# Patient Record
Sex: FEMALE | Race: OTHER | Employment: FULL TIME | URBAN - METROPOLITAN AREA
[De-identification: names, ages, dates, MRNs, and addresses within clinical notes are randomized per-mention and may not be internally consistent; named-entity substitution may affect disease eponyms.]

---

## 2017-01-09 PROBLEM — D86.0 PULMONARY SARCOIDOSIS (HCC): Status: ACTIVE | Noted: 2017-01-09

## 2017-03-24 ENCOUNTER — APPOINTMENT (OUTPATIENT)
Dept: GENERAL RADIOLOGY | Facility: HOSPITAL | Age: 57
End: 2017-03-24
Attending: EMERGENCY MEDICINE
Payer: MEDICARE

## 2017-03-24 ENCOUNTER — HOSPITAL ENCOUNTER (OUTPATIENT)
Facility: HOSPITAL | Age: 57
Setting detail: OBSERVATION
Discharge: HOME | End: 2017-03-26
Attending: EMERGENCY MEDICINE | Admitting: HOSPITALIST
Payer: MEDICARE

## 2017-03-24 DIAGNOSIS — R55 SYNCOPE, UNSPECIFIED SYNCOPE TYPE: Primary | ICD-10-CM

## 2017-03-24 DIAGNOSIS — M25.561 RIGHT KNEE PAIN, UNSPECIFIED CHRONICITY: ICD-10-CM

## 2017-03-24 PROCEDURE — 93010 ELECTROCARDIOGRAM REPORT: CPT | Performed by: EMERGENCY MEDICINE

## 2017-03-24 PROCEDURE — 84484 ASSAY OF TROPONIN QUANT: CPT | Performed by: EMERGENCY MEDICINE

## 2017-03-24 PROCEDURE — 85025 COMPLETE CBC W/AUTO DIFF WBC: CPT | Performed by: EMERGENCY MEDICINE

## 2017-03-24 PROCEDURE — 93005 ELECTROCARDIOGRAM TRACING: CPT

## 2017-03-24 PROCEDURE — 73560 X-RAY EXAM OF KNEE 1 OR 2: CPT

## 2017-03-24 PROCEDURE — 96360 HYDRATION IV INFUSION INIT: CPT

## 2017-03-24 PROCEDURE — 71010 XR CHEST AP PORTABLE  (CPT=71010): CPT

## 2017-03-24 PROCEDURE — 99285 EMERGENCY DEPT VISIT HI MDM: CPT

## 2017-03-24 PROCEDURE — 80053 COMPREHEN METABOLIC PANEL: CPT | Performed by: EMERGENCY MEDICINE

## 2017-03-25 ENCOUNTER — APPOINTMENT (OUTPATIENT)
Dept: GENERAL RADIOLOGY | Facility: HOSPITAL | Age: 57
End: 2017-03-25
Attending: EMERGENCY MEDICINE
Payer: MEDICARE

## 2017-03-25 ENCOUNTER — APPOINTMENT (OUTPATIENT)
Dept: CV DIAGNOSTICS | Facility: HOSPITAL | Age: 57
End: 2017-03-25
Attending: HOSPITALIST
Payer: MEDICARE

## 2017-03-25 PROBLEM — R55 SYNCOPE, UNSPECIFIED SYNCOPE TYPE: Status: ACTIVE | Noted: 2017-03-25

## 2017-03-25 PROBLEM — M25.561 RIGHT KNEE PAIN, UNSPECIFIED CHRONICITY: Status: ACTIVE | Noted: 2017-03-25

## 2017-03-25 PROBLEM — R55 SYNCOPE: Status: ACTIVE | Noted: 2017-03-25

## 2017-03-25 LAB
ALBUMIN SERPL BCP-MCNC: 3.7 G/DL (ref 3.5–4.8)
ALBUMIN/GLOB SERPL: 1.4 {RATIO} (ref 1–2)
ALP SERPL-CCNC: 99 U/L (ref 32–100)
ALT SERPL-CCNC: 13 U/L (ref 14–54)
ANION GAP SERPL CALC-SCNC: 8 MMOL/L (ref 0–18)
AST SERPL-CCNC: 22 U/L (ref 15–41)
BASOPHILS # BLD: 0.1 K/UL (ref 0–0.2)
BASOPHILS NFR BLD: 3 %
BENZODIAZ UR QL SCN: DETECTED
BILIRUB SERPL-MCNC: 0.5 MG/DL (ref 0.3–1.2)
BILIRUB UR QL: NEGATIVE
BUN SERPL-MCNC: 10 MG/DL (ref 8–20)
BUN/CREAT SERPL: 13.5 (ref 10–20)
CALCIUM SERPL-MCNC: 9.2 MG/DL (ref 8.5–10.5)
CHLORIDE SERPL-SCNC: 104 MMOL/L (ref 95–110)
CLARITY UR: CLEAR
CO2 SERPL-SCNC: 26 MMOL/L (ref 22–32)
COLOR UR: YELLOW
CREAT SERPL-MCNC: 0.74 MG/DL (ref 0.5–1.5)
EOSINOPHIL # BLD: 0.7 K/UL (ref 0–0.7)
EOSINOPHIL NFR BLD: 17 %
ERYTHROCYTE [DISTWIDTH] IN BLOOD BY AUTOMATED COUNT: 13.2 % (ref 11–15)
GLOBULIN PLAS-MCNC: 2.6 G/DL (ref 2.5–3.7)
GLUCOSE SERPL-MCNC: 83 MG/DL (ref 70–99)
GLUCOSE UR-MCNC: NEGATIVE MG/DL
HCT VFR BLD AUTO: 36.4 % (ref 35–48)
HGB BLD-MCNC: 11.5 G/DL (ref 12–16)
HGB UR QL STRIP.AUTO: NEGATIVE
LYMPHOCYTES # BLD: 1.6 K/UL (ref 1–4)
LYMPHOCYTES NFR BLD: 38 %
MCH RBC QN AUTO: 28.2 PG (ref 27–32)
MCHC RBC AUTO-ENTMCNC: 31.7 G/DL (ref 32–37)
MCV RBC AUTO: 89 FL (ref 80–100)
MONOCYTES # BLD: 0.4 K/UL (ref 0–1)
MONOCYTES NFR BLD: 9 %
NEUTROPHILS # BLD AUTO: 1.4 K/UL (ref 1.8–7.7)
NEUTROPHILS NFR BLD: 34 %
NITRITE UR QL STRIP.AUTO: NEGATIVE
OSMOLALITY UR CALC.SUM OF ELEC: 284 MOSM/KG (ref 275–295)
OXYCODONE UR QL SCN: DETECTED
PH UR: 5 [PH] (ref 5–8)
PLATELET # BLD AUTO: 233 K/UL (ref 140–400)
PMV BLD AUTO: 8.7 FL (ref 7.4–10.3)
POTASSIUM SERPL-SCNC: 4.6 MMOL/L (ref 3.3–5.1)
PROT SERPL-MCNC: 6.3 G/DL (ref 5.9–8.4)
PROT UR-MCNC: NEGATIVE MG/DL
RBC # BLD AUTO: 4.09 M/UL (ref 3.7–5.4)
RBC #/AREA URNS AUTO: <1 /HPF
SODIUM SERPL-SCNC: 138 MMOL/L (ref 136–144)
SP GR UR STRIP: 1.02 (ref 1–1.03)
TROPONIN I SERPL-MCNC: 0 NG/ML (ref ?–0.03)
TROPONIN I SERPL-MCNC: 0 NG/ML (ref ?–0.03)
UROBILINOGEN UR STRIP-ACNC: 2
VIT C UR-MCNC: NEGATIVE MG/DL
WBC # BLD AUTO: 4.1 K/UL (ref 4–11)
WBC #/AREA URNS AUTO: 3 /HPF

## 2017-03-25 PROCEDURE — 72100 X-RAY EXAM L-S SPINE 2/3 VWS: CPT

## 2017-03-25 PROCEDURE — 73502 X-RAY EXAM HIP UNI 2-3 VIEWS: CPT

## 2017-03-25 PROCEDURE — 93306 TTE W/DOPPLER COMPLETE: CPT

## 2017-03-25 PROCEDURE — 84484 ASSAY OF TROPONIN QUANT: CPT | Performed by: HOSPITALIST

## 2017-03-25 PROCEDURE — 80307 DRUG TEST PRSMV CHEM ANLYZR: CPT | Performed by: EMERGENCY MEDICINE

## 2017-03-25 PROCEDURE — 81001 URINALYSIS AUTO W/SCOPE: CPT | Performed by: EMERGENCY MEDICINE

## 2017-03-25 PROCEDURE — 96372 THER/PROPH/DIAG INJ SC/IM: CPT

## 2017-03-25 PROCEDURE — 93306 TTE W/DOPPLER COMPLETE: CPT | Performed by: INTERNAL MEDICINE

## 2017-03-25 RX ORDER — CARISOPRODOL 350 MG/1
350 TABLET ORAL EVERY 8 HOURS PRN
COMMUNITY
End: 2017-05-22

## 2017-03-25 RX ORDER — FUROSEMIDE 20 MG/1
20 TABLET ORAL
Status: ON HOLD | COMMUNITY
End: 2017-03-26

## 2017-03-25 RX ORDER — FLUTICASONE PROPIONATE 50 MCG
2 SPRAY, SUSPENSION (ML) NASAL DAILY
Status: DISCONTINUED | OUTPATIENT
Start: 2017-03-25 | End: 2017-03-26

## 2017-03-25 RX ORDER — MELATONIN
325 DAILY
Status: DISCONTINUED | OUTPATIENT
Start: 2017-03-25 | End: 2017-03-26

## 2017-03-25 RX ORDER — FUROSEMIDE 20 MG/1
20 TABLET ORAL DAILY
Status: DISCONTINUED | OUTPATIENT
Start: 2017-03-26 | End: 2017-03-25

## 2017-03-25 RX ORDER — SULFAMETHOXAZOLE AND TRIMETHOPRIM 800; 160 MG/1; MG/1
1 TABLET ORAL
Status: DISCONTINUED | OUTPATIENT
Start: 2017-03-27 | End: 2017-03-26

## 2017-03-25 RX ORDER — OXYCODONE AND ACETAMINOPHEN 10; 325 MG/1; MG/1
1 TABLET ORAL EVERY 6 HOURS PRN
Status: ON HOLD | COMMUNITY
End: 2017-03-26

## 2017-03-25 RX ORDER — NYSTATIN 100000 U/G
OINTMENT TOPICAL 4 TIMES DAILY
Status: DISCONTINUED | OUTPATIENT
Start: 2017-03-25 | End: 2017-03-25

## 2017-03-25 RX ORDER — POLYETHYLENE GLYCOL 3350 17 G/17G
17 POWDER, FOR SOLUTION ORAL DAILY
Status: DISCONTINUED | OUTPATIENT
Start: 2017-03-25 | End: 2017-03-26

## 2017-03-25 RX ORDER — LISINOPRIL 10 MG/1
10 TABLET ORAL 2 TIMES DAILY
Status: DISCONTINUED | OUTPATIENT
Start: 2017-03-25 | End: 2017-03-26

## 2017-03-25 RX ORDER — DULOXETIN HYDROCHLORIDE 30 MG/1
30 CAPSULE, DELAYED RELEASE ORAL EVERY EVENING
Status: DISCONTINUED | OUTPATIENT
Start: 2017-03-25 | End: 2017-03-26

## 2017-03-25 RX ORDER — FUROSEMIDE 20 MG/1
20 TABLET ORAL SEE ADMIN INSTRUCTIONS
Status: DISCONTINUED | OUTPATIENT
Start: 2017-03-25 | End: 2017-03-25

## 2017-03-25 RX ORDER — GABAPENTIN 300 MG/1
600 CAPSULE ORAL 3 TIMES DAILY
Status: DISCONTINUED | OUTPATIENT
Start: 2017-03-25 | End: 2017-03-26

## 2017-03-25 RX ORDER — OXYCODONE AND ACETAMINOPHEN 10; 325 MG/1; MG/1
1 TABLET ORAL ONCE
Status: COMPLETED | OUTPATIENT
Start: 2017-03-25 | End: 2017-03-25

## 2017-03-25 RX ORDER — ALPRAZOLAM 1 MG/1
1 TABLET ORAL 3 TIMES DAILY PRN
Status: DISCONTINUED | OUTPATIENT
Start: 2017-03-25 | End: 2017-03-26

## 2017-03-25 RX ORDER — NYSTATIN 10B UNIT
1 POWDER (EA) MISCELLANEOUS DAILY
COMMUNITY
End: 2017-07-11

## 2017-03-25 RX ORDER — ENOXAPARIN SODIUM 100 MG/ML
40 INJECTION SUBCUTANEOUS DAILY
Status: DISCONTINUED | OUTPATIENT
Start: 2017-03-25 | End: 2017-03-26

## 2017-03-25 RX ORDER — LIDOCAINE 50 MG/G
3 PATCH TOPICAL DAILY
Status: DISCONTINUED | OUTPATIENT
Start: 2017-03-25 | End: 2017-03-26

## 2017-03-25 RX ORDER — METOCLOPRAMIDE 10 MG/1
10 TABLET ORAL
Status: DISCONTINUED | OUTPATIENT
Start: 2017-03-25 | End: 2017-03-26

## 2017-03-25 RX ORDER — PANTOPRAZOLE SODIUM 40 MG/1
40 TABLET, DELAYED RELEASE ORAL
Status: DISCONTINUED | OUTPATIENT
Start: 2017-03-25 | End: 2017-03-26

## 2017-03-25 RX ORDER — IBUPROFEN 400 MG/1
400 TABLET ORAL EVERY 6 HOURS PRN
Status: DISCONTINUED | OUTPATIENT
Start: 2017-03-25 | End: 2017-03-26

## 2017-03-25 RX ORDER — POLYETHYLENE GLYCOL 3350 17 G/17G
17 POWDER, FOR SOLUTION ORAL
Status: DISCONTINUED | OUTPATIENT
Start: 2017-03-25 | End: 2017-03-25

## 2017-03-25 RX ORDER — MONTELUKAST SODIUM 10 MG/1
10 TABLET ORAL NIGHTLY
Status: DISCONTINUED | OUTPATIENT
Start: 2017-03-25 | End: 2017-03-26

## 2017-03-25 RX ORDER — TOBRAMYCIN 3 MG/ML
1 SOLUTION/ DROPS OPHTHALMIC 4 TIMES DAILY
Status: DISCONTINUED | OUTPATIENT
Start: 2017-03-25 | End: 2017-03-25

## 2017-03-25 RX ORDER — FUROSEMIDE 40 MG/1
40 TABLET ORAL DAILY
Status: DISCONTINUED | OUTPATIENT
Start: 2017-03-26 | End: 2017-03-26

## 2017-03-25 RX ORDER — DULOXETIN HYDROCHLORIDE 30 MG/1
60 CAPSULE, DELAYED RELEASE ORAL DAILY
Status: DISCONTINUED | OUTPATIENT
Start: 2017-03-25 | End: 2017-03-26

## 2017-03-25 RX ORDER — OXYCODONE AND ACETAMINOPHEN 10; 325 MG/1; MG/1
2 TABLET ORAL EVERY 6 HOURS PRN
Status: DISCONTINUED | OUTPATIENT
Start: 2017-03-25 | End: 2017-03-26

## 2017-03-25 RX ORDER — CYCLOBENZAPRINE HCL 10 MG
10 TABLET ORAL 3 TIMES DAILY PRN
Status: DISCONTINUED | OUTPATIENT
Start: 2017-03-25 | End: 2017-03-26

## 2017-03-25 RX ORDER — ATORVASTATIN CALCIUM 10 MG/1
10 TABLET, FILM COATED ORAL NIGHTLY
Status: DISCONTINUED | OUTPATIENT
Start: 2017-03-25 | End: 2017-03-26

## 2017-03-25 RX ORDER — ALBUTEROL SULFATE 90 UG/1
2 AEROSOL, METERED RESPIRATORY (INHALATION) EVERY 6 HOURS PRN
Status: DISCONTINUED | OUTPATIENT
Start: 2017-03-25 | End: 2017-03-26

## 2017-03-25 RX ORDER — RISPERIDONE 0.5 MG/1
1 TABLET, FILM COATED ORAL NIGHTLY
Status: DISCONTINUED | OUTPATIENT
Start: 2017-03-25 | End: 2017-03-26

## 2017-03-25 NOTE — ED INITIAL ASSESSMENT (HPI)
Patient state that she has 3 things she needs address:  Right knee replacement is \"bothering her\". Fainting spells that started a week ago. Patient states that she can't eat much because she has a hiatal hernia.

## 2017-03-25 NOTE — PLAN OF CARE
Patient/Family Goals    • Patient/Family Long Term Goal Progressing    • Patient/Family Short Term Goal Progressing          Pain management with Percocet  Echo result pending  PT/OT result, pt refused treatment for now due to pain in right knee  Dr. Fior Medina

## 2017-03-25 NOTE — DISCHARGE PLANNING
Per pt, pt lives at home alone. Pt stated she has a service dog. Pt reported that she has a cane ans power wheelchair, but the power wheelchair has not been working. Pt stated she is current w/ [facility]. Pt reported having a CG for 100hrs a month.  Pt r

## 2017-03-25 NOTE — CONSULTS
[facility] Cardiology  Report of Consultation    Emmanuel Watkins Patient Status:  Observation    1960 MRN [id number]   Location [address] [facility] Attending Jordan Smiley, [address] [facility] Day # 1 PCP Barron Azevedo MD     Date of Admission:  3/24/2017   Date o HISTORY      Comment rot cuff surgery    OTHER SURGICAL HISTORY      Comment R foot surgery    OTHER SURGICAL HISTORY      Comment left rotator cuff    OTHER SURGICAL HISTORY      Comment R ankle and foot surgery    OTHER SURGICAL HISTORY      Comment josé miguel Instructions   gabapentin (NEURONTIN) tab 600 mg 600 mg Oral TID   lidocaine (LIDODERM) 5 % 3 patch 3 patch Transdermal Daily   Metoclopramide HCl (REGLAN) tab 10 mg 10 mg Oral TID AC and HS   Montelukast Sodium (SINGULAIR) tab 10 mg 10 mg Oral Daily   nys trauma, normocephalic. EYES:conjunctiva not injected, no xanthelasma. ENT:mucosa pink and moist. NECK:jugular venous pressure not elevated. RESP:normal rate and rhythm, clear to auscultation. GI:soft, non-tender;rectal deferred.  MS:adequate gait for exerci Tortuous aorta. 3. Osteoarthritis. 4. Postop changes in the cervical spine, both shoulders and in the abdomen. 5. Total left shoulder if the plasty a new finding when compared to July 3, 2015.  6. A preliminary report was submitted and there is agreement wi

## 2017-03-25 NOTE — ED PROVIDER NOTES
Patient Seen in: [facility] AND [facility] Emergency Department    History   Patient presents with:  Fall (musculoskeletal, neurologic)    Stated Complaint: frequent falls     HPI    54 yo F with PMH fibromyalgia, COPD/asthma, sarcoidosis, right FITO presenting fro Ointment,  APPLY 2 GRAMS TWICE A DAY EXTERNALLY TO AFFECTED AREAS FOR KNEE AND JOINT PAIN   alprazolam 1 MG Oral Tab,  Take 1 tablet (1 mg total) by mouth 3 (three) times daily as needed for Sleep or Anxiety.    TiZANidine HCl 2 MG Oral Tab,  TAKE 3 TABLETS 5 mL (500,000 Units total) by mouth 4 (four) times daily. Swish and spit. Albuterol Sulfate HFA (PROAIR HFA) 108 (90 BASE) MCG/ACT Inhalation Aero Soln,  Inhale 2 puffs into the lungs every 6 (six) hours as needed for Wheezing.    predniSONE 20 MG Oral Ta TABLET BY MOUTH EVERY DAY   JUBLIA 10 % External Solution,     Docusate Sodium 100 MG Oral Tab,  Take 100 mg by mouth daily. lidocaine (LIDODERM) 5 % External Patch,  Place 3 patches onto the skin daily. For 12 hours then remove.    Polyethylene Glycol 33 (36.9 °C)   Temp src 03/24/17 2134 Oral   SpO2 03/24/17 2134 98 %   O2 Device 03/24/17 2134 None (Room air)       Current:/83 mmHg  Pulse 73  Temp(Src) 98.4 °F (36.9 °C) (Oral)  Resp 18  Ht 160 cm (5' 3\")  Wt 104. 327 kg  BMI 40.75 kg/m2  SpO2 98% tissue anchor in the right humeral head  Fusion hardware lower cervical spine      Results faxed    Blanco Marks M.D. This report has been electronically signed and verified by the Radiologist whose name is printed above.     DD:  03/24/2017/DT:  03/25 immediately at [phone] and permanently delete the original report and destroy any copies or printouts.     MDM     DIFFERENTIAL DIAGNOSIS: After history and physical exam differential diagnosis was considered for dysrhythmia, ACS, dehydration, polysubs

## 2017-03-26 VITALS
HEIGHT: 63 IN | HEART RATE: 76 BPM | DIASTOLIC BLOOD PRESSURE: 82 MMHG | SYSTOLIC BLOOD PRESSURE: 140 MMHG | WEIGHT: 222.13 LBS | OXYGEN SATURATION: 99 % | BODY MASS INDEX: 39.36 KG/M2 | RESPIRATION RATE: 19 BRPM | TEMPERATURE: 99 F

## 2017-03-26 PROCEDURE — 97162 PT EVAL MOD COMPLEX 30 MIN: CPT

## 2017-03-26 PROCEDURE — 97166 OT EVAL MOD COMPLEX 45 MIN: CPT

## 2017-03-26 PROCEDURE — 90471 IMMUNIZATION ADMIN: CPT

## 2017-03-26 RX ORDER — FENTANYL 100 UG/H
1 PATCH TRANSDERMAL
Status: DISCONTINUED | OUTPATIENT
Start: 2017-03-26 | End: 2017-03-26

## 2017-03-26 RX ORDER — FUROSEMIDE 20 MG/1
20 TABLET ORAL
Status: DISCONTINUED | OUTPATIENT
Start: 2017-03-26 | End: 2017-03-26

## 2017-03-26 RX ORDER — FUROSEMIDE 40 MG/1
40 TABLET ORAL DAILY
Status: DISCONTINUED | OUTPATIENT
Start: 2017-03-27 | End: 2017-03-26

## 2017-03-26 RX ORDER — ENOXAPARIN SODIUM 100 MG/ML
40 INJECTION SUBCUTANEOUS DAILY
Status: DISCONTINUED | OUTPATIENT
Start: 2017-03-26 | End: 2017-03-26

## 2017-03-26 RX ORDER — MELOXICAM 15 MG/1
TABLET ORAL
Qty: 30 TABLET | Refills: 5 | Status: SHIPPED | OUTPATIENT
Start: 2017-03-26 | End: 2017-10-06

## 2017-03-26 NOTE — PROGRESS NOTES
[facility] Cardiology  Progress Note    Rashida Guess Patient Status:  Observation    1960 MRN [id number]   Location [facility] Attending Torsten Fernandez, [address] Day # 2 PCP Hema Carlin MD     Assessment and Plan:   1.  Fall vs syncope  - co 2 Views), Right (cpt=73560)    3/25/2017  CONCLUSION:  1. Questionable widening of the bone metal interface of both the femoral and tibial components suggesting loosening. 2. No obvious fracture. 3. Soft tissue swelling. 4. Joint effusion.  5. A preliminary

## 2017-03-26 NOTE — OCCUPATIONAL THERAPY NOTE
OCCUPATIONAL THERAPY EVALUATION - INPATIENT     Room Number: 888/953-X  Evaluation Date: 3/26/2017  Type of Evaluation: Initial  Presenting Problem: Syncope    Physician Order: IP Consult to Occupational Therapy  Reason for Therapy: ADL/IADL Dysfunction an localized, unspecified site    • Head trauma    • Fibromyalgia    • COPD (chronic obstructive pulmonary disease) (HCC)        Past Surgical History      Past Surgical History    OTHER SURGICAL HISTORY      Comment rot cuff surgery    OTHER SURGICAL HISTORY ACTIVITIES OF DAILY LIVING ASSESSMENT  AM-PAC ‘6-Clicks’ Inpatient Daily Activity Short Form  How much help from another person does the patient currently need…  -   Putting on and taking off regular lower body clothing?: A Little  -   Bathing

## 2017-03-26 NOTE — DISCHARGE PLANNING
[facility] Hospitalist Discharge Summary   Patient ID:  Morro Fontaine  [id number]  56 year old  5/5/1960    Admit date: 3/24/2017  Discharge date: 3/26/2017  Primary Care Physician: Netta Norris MD   Attending Physician: No att. providers found   Consults: recent occurred 2-3 days ago and witnessed by her brother, june.  Attempted to call brother Rebeca Manzo at [phone], left a voicemail.   -ekg without sig abn, trop neg x2, no arrhythmias on monitor during stay  -orthostatics ordered and negative  -echo don Regular, no murmur  ABD: soft, NT, ND  : no rosario  EXTREMITIES: no edema, no calf tenderness, R great toe ulcer without pus or surrounding erythema/induration  Operative Procedures:    Radiology:   Xr Knee (1 Or 2 Views), Right (cpt=73560)    3/25/2017 TABLET BY MOUTH ONCE DAILY   What changed:  See the new instructions.        furosemide 20 MG Tabs   Commonly known as:  LASIX   TAKE TWO TABLETS BY MOUTH IN THE MORNING AND 1 TABLET AT LUNCH   What changed:  Another medication with the same name was remove with the same name was removed. Continue taking this medication, and follow the directions you see here. Pantoprazole Sodium 40 MG Tbec   Commonly known as:  PROTONIX   TAKE 1 TABLET BY MOUTH ONCE DAILY   What changed:  See the new instructions. ergocalciferol 44003 units Caps   Commonly known as:  DRISDOL/VITAMIN D2   TAKE ONE CAPSULE BY MOUTH ONCE A WEEK ON SUNDAYS       Fluticasone Propionate 50 MCG/ACT Susp   Commonly known as:  FLONASE   INSTILL TWO SPRAYS IN EACH NOSTRIL ONCE DAILY prior  Wound Care: as instructed per pcp/wound care per Man 78  Code Status: Full Code    Important follow up: Follow-up Information     Follow up with Stevie Dillard MD. Schedule an appointment as soon as possible for a visit in 1 week.     Special

## 2017-03-26 NOTE — DISCHARGE PLANNING
ASHLEY was informed that pt is able to discharge today. ASHLEY sent order to resume HHC to [facility]. ASHLEY attempted to call - left voicemail. ASHLEY received signed DME forms. ASHLEY faxed form for Hospital bed to [facility]; ASHLEY faxed form for commode to [facility].  Per Pt, MD

## 2017-03-26 NOTE — PLAN OF CARE
DISCHARGE PLANNING    • Discharge to home or other facility with appropriate resources Completed        Impaired Activities of Daily Living    • Achieve highest/safest level of independence in self care Completed        Impaired Functional Mobility    • Ac

## 2017-03-26 NOTE — PHYSICAL THERAPY NOTE
PHYSICAL THERAPY QUICK EVALUATION - INPATIENT    Room Number: 055/728-I  Evaluation Date: 3/26/2017  Presenting Problem: syncope  Physician Order: PT Eval and Treat    Problem List  Principal Problem:    Syncope, unspecified syncope type  Active Problems: SUBJECTIVE  \"My R knee hurts, but it's going to hurt it's falling apart\"    OBJECTIVE     Fall Risk: Standard fall risk    WEIGHT BEARING RESTRICTION  Weight Bearing Restriction: None                PAIN ASSESSMENT  Rating:  (did not quantify)  Locat mobility modified independent. Transfers with quad cane modified independent. Gait training 20ft with quad cane and SV. Toilet transfer with SV. Pt declined ascend/descend of stairs at this time.  Pt stated she feels she is at her baseline mobility at this

## 2017-03-27 ENCOUNTER — TELEPHONE (OUTPATIENT)
Dept: MEDSURG UNIT | Facility: HOSPITAL | Age: 57
End: 2017-03-27

## 2017-03-29 ENCOUNTER — LAB ENCOUNTER (OUTPATIENT)
Dept: LAB | Facility: HOSPITAL | Age: 57
End: 2017-03-29
Attending: ORTHOPAEDIC SURGERY
Payer: MEDICARE

## 2017-03-29 DIAGNOSIS — M25.461 PAIN AND SWELLING OF KNEE, RIGHT: Primary | ICD-10-CM

## 2017-03-29 DIAGNOSIS — M25.561 PAIN AND SWELLING OF KNEE, RIGHT: Primary | ICD-10-CM

## 2017-03-29 LAB
BASOPHILS NFR FLD: 0 %
EOSINOPHIL NFR FLD: 0 %
LYMPHOCYTES NFR FLD: 9 %
MONOCYTES NFR FLD: 17 %
NEUTROPHILS NFR CSF: 74 %
RBC # FLD: ABNORMAL /CUMM (ref ?–1)
WBC # FLD: 2361 /CUMM (ref ?–1)

## 2017-03-29 PROCEDURE — 89050 BODY FLUID CELL COUNT: CPT

## 2017-03-29 PROCEDURE — 87075 CULTR BACTERIA EXCEPT BLOOD: CPT

## 2017-03-29 PROCEDURE — 87070 CULTURE OTHR SPECIMN AEROBIC: CPT

## 2017-03-29 PROCEDURE — 87205 SMEAR GRAM STAIN: CPT

## 2017-03-29 PROCEDURE — 89051 BODY FLUID CELL COUNT: CPT

## 2017-06-23 ENCOUNTER — OFFICE VISIT (OUTPATIENT)
Dept: PAIN CLINIC | Facility: HOSPITAL | Age: 57
End: 2017-06-23
Attending: ANESTHESIOLOGY
Payer: MEDICARE

## 2017-06-23 VITALS
SYSTOLIC BLOOD PRESSURE: 132 MMHG | BODY MASS INDEX: 34.72 KG/M2 | WEIGHT: 216 LBS | HEIGHT: 66 IN | DIASTOLIC BLOOD PRESSURE: 72 MMHG | HEART RATE: 74 BPM | RESPIRATION RATE: 18 BRPM

## 2017-06-23 DIAGNOSIS — M54.12 CERVICAL RADICULOPATHY AT C5: Primary | ICD-10-CM

## 2017-06-23 PROCEDURE — 99201 HC OUTPT EVAL AND MGNT NEW PT LEVEL 1: CPT

## 2017-06-23 NOTE — PROGRESS NOTES
06/23/17  PRESENTS IN ELECTRIC SCOOTER ACCOMPANIED BY HER SMALL DOG SNOOP;  RETURNS TO CPM FOR PAIN MANAGEMENT OF HER CHRONIC GENERALIZED PAIN;  WELL KNOWN TO DR. FAJARDO; IN TO SEE PT;  REVIEW ENCOUNTER SUMMARY FOR THE PLAN OF CARE.

## 2017-06-23 NOTE — CHRONIC PAIN
Pt has cervical neck pain.  I did a epidural in neck at 10/2016 with excellent pain relief  Plan: Repeat cervical block C5/6

## 2017-07-20 PROBLEM — S82.841A BIMALLEOLAR ANKLE FRACTURE, RIGHT, CLOSED, INITIAL ENCOUNTER: Status: ACTIVE | Noted: 2017-07-20

## 2018-01-11 PROBLEM — M25.50 MULTIPLE JOINT PAIN: Status: ACTIVE | Noted: 2018-01-11

## 2018-01-11 PROBLEM — M54.16 LUMBAR RADICULITIS: Status: ACTIVE | Noted: 2018-01-11

## 2018-01-31 PROBLEM — K56.609 SMALL BOWEL OBSTRUCTION (HCC): Status: ACTIVE | Noted: 2018-01-31

## 2018-01-31 PROBLEM — G89.4 CHRONIC PAIN DISORDER: Status: ACTIVE | Noted: 2018-01-31

## 2018-01-31 PROBLEM — E55.9 VITAMIN D DEFICIENCY: Status: ACTIVE | Noted: 2018-01-31

## 2018-01-31 PROBLEM — M48.02 CERVICAL STENOSIS OF SPINAL CANAL: Status: ACTIVE | Noted: 2018-01-31

## 2018-03-01 PROBLEM — Z98.890 HISTORY OF LUMBAR SURGERY: Status: ACTIVE | Noted: 2018-03-01

## 2018-03-01 PROBLEM — M48.061 SPINAL STENOSIS OF LUMBAR REGION, UNSPECIFIED WHETHER NEUROGENIC CLAUDICATION PRESENT: Status: ACTIVE | Noted: 2018-03-01

## 2018-04-23 PROBLEM — D64.9 ANEMIA: Status: ACTIVE | Noted: 2018-04-23

## 2018-04-23 PROBLEM — D72.819 LEUKOPENIA: Status: ACTIVE | Noted: 2018-04-23

## 2018-04-23 PROCEDURE — 82607 VITAMIN B-12: CPT | Performed by: INTERNAL MEDICINE

## 2018-04-23 PROCEDURE — 86334 IMMUNOFIX E-PHORESIS SERUM: CPT | Performed by: INTERNAL MEDICINE

## 2018-04-23 PROCEDURE — 83021 HEMOGLOBIN CHROMOTOGRAPHY: CPT | Performed by: INTERNAL MEDICINE

## 2018-04-23 PROCEDURE — 83020 HEMOGLOBIN ELECTROPHORESIS: CPT | Performed by: INTERNAL MEDICINE

## 2018-04-23 PROCEDURE — 84165 PROTEIN E-PHORESIS SERUM: CPT | Performed by: INTERNAL MEDICINE

## 2018-04-23 PROCEDURE — 82746 ASSAY OF FOLIC ACID SERUM: CPT | Performed by: INTERNAL MEDICINE

## 2018-04-23 PROCEDURE — 83883 ASSAY NEPHELOMETRY NOT SPEC: CPT | Performed by: INTERNAL MEDICINE

## 2018-04-24 PROBLEM — E53.8 FOLATE DEFICIENCY: Status: ACTIVE | Noted: 2018-04-24

## 2018-05-01 PROBLEM — S82.841S ANKLE FRACTURE, BIMALLEOLAR, CLOSED, RIGHT, SEQUELA: Status: ACTIVE | Noted: 2017-07-20

## 2018-08-22 ENCOUNTER — HOSPITAL ENCOUNTER (INPATIENT)
Facility: HOSPITAL | Age: 58
LOS: 6 days | Discharge: NURSING, REHAB OR LONG-TERM CARE | DRG: 571 | End: 2018-08-28
Attending: EMERGENCY MEDICINE | Admitting: HOSPITALIST
Payer: MEDICARE

## 2018-08-22 ENCOUNTER — APPOINTMENT (OUTPATIENT)
Dept: GENERAL RADIOLOGY | Facility: HOSPITAL | Age: 58
DRG: 571 | End: 2018-08-22
Attending: EMERGENCY MEDICINE
Payer: MEDICARE

## 2018-08-22 DIAGNOSIS — F41.9 ANXIETY: ICD-10-CM

## 2018-08-22 DIAGNOSIS — L03.116 CELLULITIS OF LEFT LOWER EXTREMITY: Primary | ICD-10-CM

## 2018-08-22 DIAGNOSIS — F11.90 CHRONIC, CONTINUOUS USE OF OPIOIDS: ICD-10-CM

## 2018-08-22 DIAGNOSIS — Z96.651 PRESENCE OF TOTAL RIGHT KNEE JOINT PROSTHESIS: ICD-10-CM

## 2018-08-22 DIAGNOSIS — S82.841A BIMALLEOLAR ANKLE FRACTURE, RIGHT, CLOSED, INITIAL ENCOUNTER: ICD-10-CM

## 2018-08-22 DIAGNOSIS — S52.592A OTHER CLOSED FRACTURE OF DISTAL END OF LEFT RADIUS, INITIAL ENCOUNTER: ICD-10-CM

## 2018-08-22 LAB
ANION GAP SERPL CALC-SCNC: 6 MMOL/L (ref 0–18)
BASOPHILS # BLD: 0 K/UL (ref 0–0.2)
BASOPHILS NFR BLD: 1 %
BUN SERPL-MCNC: 5 MG/DL (ref 8–20)
BUN/CREAT SERPL: 7.6 (ref 10–20)
CALCIUM SERPL-MCNC: 8.9 MG/DL (ref 8.5–10.5)
CHLORIDE SERPL-SCNC: 99 MMOL/L (ref 95–110)
CO2 SERPL-SCNC: 29 MMOL/L (ref 22–32)
CREAT SERPL-MCNC: 0.66 MG/DL (ref 0.5–1.5)
EOSINOPHIL # BLD: 0.2 K/UL (ref 0–0.7)
EOSINOPHIL NFR BLD: 4 %
ERYTHROCYTE [DISTWIDTH] IN BLOOD BY AUTOMATED COUNT: 14 % (ref 11–15)
GLUCOSE SERPL-MCNC: 84 MG/DL (ref 70–99)
HCT VFR BLD AUTO: 33.8 % (ref 35–48)
HGB BLD-MCNC: 10.9 G/DL (ref 12–16)
LACTATE SERPL-SCNC: 1.3 MMOL/L (ref 0.5–2.2)
LYMPHOCYTES # BLD: 0.8 K/UL (ref 1–4)
LYMPHOCYTES NFR BLD: 16 %
MCH RBC QN AUTO: 27.9 PG (ref 27–32)
MCHC RBC AUTO-ENTMCNC: 32.1 G/DL (ref 32–37)
MCV RBC AUTO: 87 FL (ref 80–100)
MONOCYTES # BLD: 0.5 K/UL (ref 0–1)
MONOCYTES NFR BLD: 11 %
NEUTROPHILS # BLD AUTO: 3.5 K/UL (ref 1.8–7.7)
NEUTROPHILS NFR BLD: 69 %
OSMOLALITY UR CALC.SUM OF ELEC: 274 MOSM/KG (ref 275–295)
PLATELET # BLD AUTO: 168 K/UL (ref 140–400)
PMV BLD AUTO: 8.5 FL (ref 7.4–10.3)
POTASSIUM SERPL-SCNC: 3.9 MMOL/L (ref 3.3–5.1)
RBC # BLD AUTO: 3.89 M/UL (ref 3.7–5.4)
SODIUM SERPL-SCNC: 134 MMOL/L (ref 136–144)
WBC # BLD AUTO: 5.1 K/UL (ref 4–11)

## 2018-08-22 PROCEDURE — 96365 THER/PROPH/DIAG IV INF INIT: CPT

## 2018-08-22 PROCEDURE — 87040 BLOOD CULTURE FOR BACTERIA: CPT | Performed by: EMERGENCY MEDICINE

## 2018-08-22 PROCEDURE — 96361 HYDRATE IV INFUSION ADD-ON: CPT

## 2018-08-22 PROCEDURE — 80048 BASIC METABOLIC PNL TOTAL CA: CPT | Performed by: EMERGENCY MEDICINE

## 2018-08-22 PROCEDURE — 96375 TX/PRO/DX INJ NEW DRUG ADDON: CPT

## 2018-08-22 PROCEDURE — 36415 COLL VENOUS BLD VENIPUNCTURE: CPT

## 2018-08-22 PROCEDURE — 83605 ASSAY OF LACTIC ACID: CPT | Performed by: EMERGENCY MEDICINE

## 2018-08-22 PROCEDURE — 96367 TX/PROPH/DG ADDL SEQ IV INF: CPT

## 2018-08-22 PROCEDURE — 99285 EMERGENCY DEPT VISIT HI MDM: CPT

## 2018-08-22 PROCEDURE — 73590 X-RAY EXAM OF LOWER LEG: CPT | Performed by: EMERGENCY MEDICINE

## 2018-08-22 PROCEDURE — 85025 COMPLETE CBC W/AUTO DIFF WBC: CPT | Performed by: EMERGENCY MEDICINE

## 2018-08-22 RX ORDER — MONTELUKAST SODIUM 10 MG/1
10 TABLET ORAL
Status: DISCONTINUED | OUTPATIENT
Start: 2018-08-23 | End: 2018-08-28

## 2018-08-22 RX ORDER — FLUTICASONE PROPIONATE 50 MCG
2 SPRAY, SUSPENSION (ML) NASAL DAILY
Status: DISCONTINUED | OUTPATIENT
Start: 2018-08-22 | End: 2018-08-28

## 2018-08-22 RX ORDER — MORPHINE SULFATE 4 MG/ML
4 INJECTION, SOLUTION INTRAMUSCULAR; INTRAVENOUS ONCE
Status: COMPLETED | OUTPATIENT
Start: 2018-08-22 | End: 2018-08-22

## 2018-08-22 RX ORDER — ACETAMINOPHEN 325 MG/1
650 TABLET ORAL EVERY 6 HOURS PRN
Status: DISCONTINUED | OUTPATIENT
Start: 2018-08-22 | End: 2018-08-28

## 2018-08-22 RX ORDER — BISACODYL 10 MG
10 SUPPOSITORY, RECTAL RECTAL
Status: DISCONTINUED | OUTPATIENT
Start: 2018-08-22 | End: 2018-08-28

## 2018-08-22 RX ORDER — FLUCONAZOLE 100 MG/1
100 TABLET ORAL DAILY
Status: DISCONTINUED | OUTPATIENT
Start: 2018-08-22 | End: 2018-08-28

## 2018-08-22 RX ORDER — LISINOPRIL 10 MG/1
10 TABLET ORAL 2 TIMES DAILY
Status: DISCONTINUED | OUTPATIENT
Start: 2018-08-22 | End: 2018-08-28

## 2018-08-22 RX ORDER — ATORVASTATIN CALCIUM 10 MG/1
10 TABLET, FILM COATED ORAL NIGHTLY
Status: DISCONTINUED | OUTPATIENT
Start: 2018-08-22 | End: 2018-08-28

## 2018-08-22 RX ORDER — GABAPENTIN 300 MG/1
600 CAPSULE ORAL
Status: DISCONTINUED | OUTPATIENT
Start: 2018-08-22 | End: 2018-08-28

## 2018-08-22 RX ORDER — ACETAMINOPHEN 500 MG
1000 TABLET ORAL ONCE
Status: COMPLETED | OUTPATIENT
Start: 2018-08-22 | End: 2018-08-22

## 2018-08-22 RX ORDER — OXYCODONE HYDROCHLORIDE 5 MG/1
30 TABLET ORAL EVERY 6 HOURS PRN
Status: DISCONTINUED | OUTPATIENT
Start: 2018-08-22 | End: 2018-08-28

## 2018-08-22 RX ORDER — SULFAMETHOXAZOLE AND TRIMETHOPRIM 800; 160 MG/1; MG/1
1 TABLET ORAL
Status: DISCONTINUED | OUTPATIENT
Start: 2018-08-22 | End: 2018-08-23

## 2018-08-22 RX ORDER — PANTOPRAZOLE SODIUM 40 MG/1
40 TABLET, DELAYED RELEASE ORAL
Status: DISCONTINUED | OUTPATIENT
Start: 2018-08-23 | End: 2018-08-28

## 2018-08-22 RX ORDER — ENOXAPARIN SODIUM 100 MG/ML
40 INJECTION SUBCUTANEOUS DAILY
Status: DISCONTINUED | OUTPATIENT
Start: 2018-08-23 | End: 2018-08-24

## 2018-08-22 RX ORDER — SPIRONOLACTONE 25 MG/1
25 TABLET ORAL DAILY
Status: DISCONTINUED | OUTPATIENT
Start: 2018-08-22 | End: 2018-08-28

## 2018-08-22 RX ORDER — METOCLOPRAMIDE HYDROCHLORIDE 5 MG/ML
10 INJECTION INTRAMUSCULAR; INTRAVENOUS EVERY 8 HOURS PRN
Status: DISCONTINUED | OUTPATIENT
Start: 2018-08-22 | End: 2018-08-28

## 2018-08-22 RX ORDER — POLYETHYLENE GLYCOL 3350 17 G/17G
17 POWDER, FOR SOLUTION ORAL DAILY PRN
Status: DISCONTINUED | OUTPATIENT
Start: 2018-08-22 | End: 2018-08-28

## 2018-08-22 RX ORDER — TORSEMIDE 20 MG/1
20 TABLET ORAL DAILY
Status: DISCONTINUED | OUTPATIENT
Start: 2018-08-22 | End: 2018-08-28

## 2018-08-22 RX ORDER — ZOLPIDEM TARTRATE 5 MG/1
5 TABLET ORAL NIGHTLY PRN
Status: DISCONTINUED | OUTPATIENT
Start: 2018-08-22 | End: 2018-08-28

## 2018-08-22 RX ORDER — DULOXETIN HYDROCHLORIDE 30 MG/1
30 CAPSULE, DELAYED RELEASE ORAL DAILY
Status: DISCONTINUED | OUTPATIENT
Start: 2018-08-22 | End: 2018-08-28

## 2018-08-22 RX ORDER — OXYCODONE AND ACETAMINOPHEN 10; 325 MG/1; MG/1
1 TABLET ORAL EVERY 4 HOURS PRN
Status: DISCONTINUED | OUTPATIENT
Start: 2018-08-22 | End: 2018-08-24

## 2018-08-22 RX ORDER — SODIUM CHLORIDE 0.9 % (FLUSH) 0.9 %
3 SYRINGE (ML) INJECTION AS NEEDED
Status: DISCONTINUED | OUTPATIENT
Start: 2018-08-22 | End: 2018-08-28

## 2018-08-22 RX ORDER — DULOXETIN HYDROCHLORIDE 30 MG/1
60 CAPSULE, DELAYED RELEASE ORAL DAILY
Status: DISCONTINUED | OUTPATIENT
Start: 2018-08-22 | End: 2018-08-28

## 2018-08-22 RX ORDER — TIZANIDINE 2 MG/1
2 TABLET ORAL EVERY 6 HOURS PRN
Status: DISCONTINUED | OUTPATIENT
Start: 2018-08-22 | End: 2018-08-28

## 2018-08-22 RX ORDER — ONDANSETRON 2 MG/ML
4 INJECTION INTRAMUSCULAR; INTRAVENOUS EVERY 6 HOURS PRN
Status: DISCONTINUED | OUTPATIENT
Start: 2018-08-22 | End: 2018-08-28

## 2018-08-22 RX ORDER — MORPHINE SULFATE 4 MG/ML
INJECTION, SOLUTION INTRAMUSCULAR; INTRAVENOUS
Status: DISPENSED
Start: 2018-08-22 | End: 2018-08-23

## 2018-08-22 RX ORDER — ALPRAZOLAM 1 MG/1
1 TABLET ORAL 4 TIMES DAILY PRN
Status: DISCONTINUED | OUTPATIENT
Start: 2018-08-22 | End: 2018-08-28

## 2018-08-22 RX ORDER — FENTANYL 100 UG/H
1 PATCH TRANSDERMAL
Status: DISCONTINUED | OUTPATIENT
Start: 2018-08-22 | End: 2018-08-28

## 2018-08-22 NOTE — H&P
[facility] Hospitalist H&P       CC: Patient presents with:  Pain (neurologic)  Cellulitis (integumentary, infectious)       PCP: Mayi Smith MD    ASSESSMENT / PLAN:   Ms. Corrine Aguayo is a 59yo female with hx of DDD, DJD S/P left hip pinning, Recent R TSA,RA, a DO [facility] Hospitalist  Answering Service number: [phone]    HPI       History of Present Illness: Ms. Billings Call is a 59yo female with hx of DDD, DJD S/P left hip pinning, Recent R TSA,RA, anemia,  Anxiety disorder, PTSD, prior c.diff, arthritis, fibromya [Iodine (Topica*        Comment:Nuclear for heart stress test     Home Medications:    No outpatient prescriptions have been marked as taking for the 8/22/18 encounter [facility] Encounter).       Soc Hx     Smoking status: Never Smoker    Smokeless tobacco: 5. 1   PLT  168         Recent Labs   Lab  08/22/18   1615   GLU  84   BUN  5*   CREATSERUM  0.66   GFRAA  >60   GFRNAA  >60   CA  8.9   NA  134*   K  3.9   CL  99   CO2  29       Lab Results  Component Value Date   WBC 5.1 08/22/2018   HGB 10.9 08/22/2018

## 2018-08-22 NOTE — ED INITIAL ASSESSMENT (HPI)
Patient here with left lower leg pain. States it is related to cellulitis. +left lower leg redness and swelling.

## 2018-08-22 NOTE — ED PROVIDER NOTES
Patient Seen in: [facility] AND [facility] Emergency Department     History   Patient presents with:  Pain (neurologic)  Cellulitis (integumentary, infectious)    Stated Complaint: cellulitis/pain    HPI    63year old female with history of recurrent infections, OxyCODONE HCl IR 15 MG Oral Tab,  Take 1-2 tablets (15-30 mg total) by mouth every 6 (six) hours as needed for Pain.    cyanocobalamin 1000 MCG/ML Injection Solution,  INJECT 1 ML ONCE A MONTH   Syringe, Disposable, (BD TUBERCULIN SYRINGE) 1 ML Does not ap DULoxetine HCl 30 MG Oral Cap DR Particles,  TAKE ONE CAPSULE BY MOUTH ONCE DAILY   clotrimazole 10 MG Mouth/Throat Erik,  Take 1 tablet (10 mg total) by mouth 5 (five) times daily.    fluconazole 100 MG Oral Tab,  Take 1 tablet (100 mg total) by mouth d Pantoprazole Sodium 40 MG Oral Tab EC,  Take 1 tablet (40 mg total) by mouth once daily.    atorvastatin 10 MG Oral Tab,  TAKE ONE TABLET BY MOUTH AT BED TIME   bisacodyl 5 MG Oral Tab EC,  Take 1 tablet (5 mg total) by mouth daily as needed for constipati triamcinolone acetonide 0.1 % External Ointment,  APPLY TOPICALLY TWO TIMES A DAY TO ANY DRY AREAS ON THE LEG   Sulfamethoxazole-TMP -160 MG Oral Tab per tablet,  TAKE 1 TABLET BY MOUTH THREE TIMES A WEEK (MWF)   ONDANSETRON 4 MG Oral Tablet Dispers Temp: (!) 100.6 °F (38.1 °C) [08/22/18 1532]  Temp src: Tympanic [08/22/18 1532]  SpO2: 95 % [08/22/18 1744]  O2 Device: None (Room air) [08/22/18 1532]    Current:/86   Pulse 78   Temp 100.2 °F (37.9 °C) (Temporal)   Resp 12   Wt 95.3 kg   SpO2 95% Lymphocyte Absolute 0.8 (*)     All other components within normal limits   LACTIC ACID, PLASMA - Normal   CBC WITH DIFFERENTIAL WITH PLATELET    Narrative: The following orders were created for panel order CBC WITH DIFFERENTIAL WITH PLATELET.   Proced sodium chloride 0.9% IV bolus 2,859 mL (2,859 mL Intravenous New Bag 8/22/18 1546)   Vancomycin HCl (VANCOCIN) 1,750 mg in sodium chloride 0.9 % 500 mL IVPB (1,750 mg Intravenous New Bag 8/22/18 1800)   morphINE sulfate (PF) 4 MG/ML injection 4 mg (not adm The patient already has has CHRONIC AIRWAY OBSTRUCTION; ASTHMA; Type 2 diabetes mellitus ([facility] [address] Cellulitis of leg, right; Venous (peripheral) insufficiency; Panniculitis, unspecified site; Vitamin B 12 deficiency; Pannus, abdominal; Hand or foot spasms;  Ra 95% on room air, normal    Consults:           IP consult to Infectious Disease Once    MD Discussions or Sign-Outs:   prasad Tinsley    Re-Evaluation   630p:    patient's symptoms improved after ED treatment.    overall clinical presentation including gene Anticipatory guidance, discharge instructions upon discharge, disease/injury specific precautions, return instructions, and follow up information were provided prior to discharge from the ED if sent home.  We also recommended that the patient schedule thomaso

## 2018-08-23 ENCOUNTER — APPOINTMENT (OUTPATIENT)
Dept: ULTRASOUND IMAGING | Facility: HOSPITAL | Age: 58
DRG: 571 | End: 2018-08-23
Attending: INTERNAL MEDICINE
Payer: MEDICARE

## 2018-08-23 LAB
ANION GAP SERPL CALC-SCNC: 9 MMOL/L (ref 0–18)
BASOPHILS # BLD: 0.1 K/UL (ref 0–0.2)
BASOPHILS NFR BLD: 1 %
BUN SERPL-MCNC: 5 MG/DL (ref 8–20)
BUN/CREAT SERPL: 9.4 (ref 10–20)
CALCIUM SERPL-MCNC: 8.6 MG/DL (ref 8.5–10.5)
CHLORIDE SERPL-SCNC: 107 MMOL/L (ref 95–110)
CO2 SERPL-SCNC: 21 MMOL/L (ref 22–32)
CREAT SERPL-MCNC: 0.53 MG/DL (ref 0.5–1.5)
EOSINOPHIL # BLD: 0.2 K/UL (ref 0–0.7)
EOSINOPHIL NFR BLD: 6 %
ERYTHROCYTE [DISTWIDTH] IN BLOOD BY AUTOMATED COUNT: 14.5 % (ref 11–15)
GLUCOSE SERPL-MCNC: 76 MG/DL (ref 70–99)
HCT VFR BLD AUTO: 35.3 % (ref 35–48)
HGB BLD-MCNC: 10.7 G/DL (ref 12–16)
LYMPHOCYTES # BLD: 0.9 K/UL (ref 1–4)
LYMPHOCYTES NFR BLD: 21 %
MCH RBC QN AUTO: 27.3 PG (ref 27–32)
MCHC RBC AUTO-ENTMCNC: 30.2 G/DL (ref 32–37)
MCV RBC AUTO: 90.3 FL (ref 80–100)
MONOCYTES # BLD: 0.6 K/UL (ref 0–1)
MONOCYTES NFR BLD: 14 %
NEUTROPHILS # BLD AUTO: 2.4 K/UL (ref 1.8–7.7)
NEUTROPHILS NFR BLD: 58 %
OSMOLALITY UR CALC.SUM OF ELEC: 280 MOSM/KG (ref 275–295)
PLATELET # BLD AUTO: 135 K/UL (ref 140–400)
PMV BLD AUTO: 8.2 FL (ref 7.4–10.3)
POTASSIUM SERPL-SCNC: 4 MMOL/L (ref 3.3–5.1)
RBC # BLD AUTO: 3.91 M/UL (ref 3.7–5.4)
SODIUM SERPL-SCNC: 137 MMOL/L (ref 136–144)
WBC # BLD AUTO: 4.1 K/UL (ref 4–11)

## 2018-08-23 PROCEDURE — A4216 STERILE WATER/SALINE, 10 ML: HCPCS

## 2018-08-23 PROCEDURE — 76882 US LMTD JT/FCL EVL NVASC XTR: CPT | Performed by: INTERNAL MEDICINE

## 2018-08-23 PROCEDURE — 80048 BASIC METABOLIC PNL TOTAL CA: CPT | Performed by: HOSPITALIST

## 2018-08-23 PROCEDURE — 85025 COMPLETE CBC W/AUTO DIFF WBC: CPT | Performed by: HOSPITALIST

## 2018-08-23 PROCEDURE — A4216 STERILE WATER/SALINE, 10 ML: HCPCS | Performed by: HOSPITALIST

## 2018-08-23 RX ORDER — 0.9 % SODIUM CHLORIDE 0.9 %
VIAL (ML) INJECTION
Status: COMPLETED
Start: 2018-08-23 | End: 2018-08-23

## 2018-08-23 NOTE — PROGRESS NOTES
[facility] Hospitalist Progress Note     CC: Hospital Follow up    PCP: Genesis Guido MD       Assessment/Plan:   Ms. Carson Flores is a 59yo female with hx of DDD, DJD S/P left hip pinning, Recent R TSA,RA, anemia,  Anxiety disorder, PTSD, prior c.diff, arthritis, [phone]     Subjective:     No CP, SOB, or N/V.    OBJECTIVE:    Blood pressure (!) 126/91, pulse 81, temperature 98.9 °F (37.2 °C), temperature source Oral, resp. rate 16, weight 192 lb 4.8 oz (87.2 kg), SpO2 100 %, not currently breastfeeding.     Te narrowing of the lateral and medial joint spaces and mild narrowing of the patellofemoral joint. Moderate diffuse soft tissue swelling especially at the pretibial region of the mid left  leg. Correlate clinically.      Dictated by (CST): Violeta Ramirez MD o

## 2018-08-23 NOTE — CONSULTS
[facility] AND [facility] Infectious Disease  Report of Consultation    Farshad Minaya Patient Status:  Inpatient    1960 MRN [id number]   Location [address] [place] [address] Attending Dottie Olsen, [address] [facility] Day # 1 PCP Nik Soria MD     Date of Adm • Hypertension Father    • Cancer Mother      lung   • Hypertension Mother    • Breast Cancer Sister      age 39   • Breast Cancer Maternal Grandmother      age 76      reports that she has never smoked.  She has never used smokeless tobacco. She reports 5 mg, 5 mg, Oral, Daily PRN  •  Ciclopirox 8 % SOLN 1 Application, 1 Application, Topical, Nightly  •  DULoxetine HCl (CYMBALTA) DR particles cap 30 mg, 30 mg, Oral, Daily  •  DULoxetine HCl (CYMBALTA) DR particles cap 60 mg, 60 mg, Oral, Daily  •  Adrian hematoma. Neurological: No focal neurologic deficits, seizures, tremors. Psych:  No h/o anxiety, depression, other psych d/o. Endocrine: No history of of diabetes, thyroid disorder. Remainder of 12 point review of systems otherwise negative.     Vi Negative    Assessment and Plan:    1.   Acute LLE cellulitis with a h/o b/l LE cellulitis in the past  - Nidus due to recent trauma to the leg, scraping it on a screen followed by L shin trauma with large hematoma  - IV vancomycin and meropenem started

## 2018-08-23 NOTE — PLAN OF CARE
Problem: Patient Centered Care  Goal: Patient preferences are identified and integrated in the patient's plan of care  Interventions:  - What would you like us to know as we care for you?   - Provide timely, complete, and accurate information to patient/fa cognitive and physical deficits and behaviors that affect risk of falls.   - [place] fall precautions as indicated by assessment.  - Educate pt/family on patient safety including physical limitations  - Instruct pt to call for assistance with activity bas

## 2018-08-23 NOTE — PROGRESS NOTES
[address] [facility] dosing service    Initial Pharmacokinetic Consult for Vancomycin Dosing     Orlena Ganser is a 63year old female who is being treated for cellulitis.   Pharmacy has been asked to dose Vancomycin by Dr. Valadez Mis    She is allergic to lyrica [preg renal function, and pharmacokinetics. 2.  Pharmacy ordered Vancomycin trough level(s) prior to 4th dose. Goal trough level 10-15 ug/mL. 3.  Pharmacy will need BUN/Scr daily while on Vancomycin to assess renal function.     4.  Pharmacy will follow a

## 2018-08-23 NOTE — PROGRESS NOTES
[address] [facility] Dosing Service  Antibiotic Dosing    July Hay is a 63year old female for whom pharmacy is dosing Meropenem for treatment of  cellulitis.      Allergies: is allergic to lyrica [pregabalin]; pcns [penicillins]; adhesive tape; clindamycin;

## 2018-08-23 NOTE — BH PROGRESS NOTE
Behavioral Health Note  CHIEF COMPLAINT  Recurrent cellulitis   REASON FOR ADMISSION  Recurrent cellulitis     SOCIAL HISTORY  The patient lives alone and has no family.   She is mostly w/c bound but stated that she is able to get to the grocery store using limited  SUICIDAL RISK ASSESSMENT  No SI/HI  ASSESSMENT  The patient has a h/o anxiety and PTSD    PLAN  1. Patient declined most of the assessment and declined any resources.    David Kim \[facility]\""W

## 2018-08-23 NOTE — PROGRESS NOTES
[facility] Hospitalist Progress Note     CC: Hospital Follow up    PCP: Hema Carlni MD       Assessment/Plan:   Ms. Contreras Perez is a 61yo female with hx of DDD, DJD S/P left hip pinning, Recent R TSA,RA, anemia,  Anxiety disorder, PTSD, prior c.diff, arthritis, therapeutic plan as outlined        Lurdes Pike DO     [facility] Hospitalist  Answering Service number: [phone]     Subjective:     Leg feels the same, no worsening pain. States drowsy as she didn't sleep last night. No diarrhea.  No CP, SOB, or N/V.    OBJE input(s): ALT, AST, ALB, AMYLASE, LIPASE, LDH in the last 168 hours. Invalid input(s): ALPHOS, TBIL, DBIL, TPROT      Imaging:  Xr Tibia + Fibula (2 Views), Left (cpt=73590)    Result Date: 8/22/2018  CONCLUSION:  1.  No acute appearing fracture or dislo

## 2018-08-24 ENCOUNTER — ANESTHESIA EVENT (OUTPATIENT)
Dept: SURGERY | Facility: HOSPITAL | Age: 58
DRG: 571 | End: 2018-08-24
Payer: MEDICARE

## 2018-08-24 ENCOUNTER — ANESTHESIA (OUTPATIENT)
Dept: SURGERY | Facility: HOSPITAL | Age: 58
DRG: 571 | End: 2018-08-24
Payer: MEDICARE

## 2018-08-24 LAB
ANION GAP SERPL CALC-SCNC: 7 MMOL/L (ref 0–18)
BASOPHILS # BLD: 0 K/UL (ref 0–0.2)
BASOPHILS NFR BLD: 1 %
BUN SERPL-MCNC: 5 MG/DL (ref 8–20)
BUN/CREAT SERPL: 8.3 (ref 10–20)
CALCIUM SERPL-MCNC: 8.7 MG/DL (ref 8.5–10.5)
CHLORIDE SERPL-SCNC: 101 MMOL/L (ref 95–110)
CO2 SERPL-SCNC: 30 MMOL/L (ref 22–32)
CREAT SERPL-MCNC: 0.6 MG/DL (ref 0.5–1.5)
EOSINOPHIL # BLD: 0.1 K/UL (ref 0–0.7)
EOSINOPHIL NFR BLD: 4 %
ERYTHROCYTE [DISTWIDTH] IN BLOOD BY AUTOMATED COUNT: 13.9 % (ref 11–15)
GLUCOSE SERPL-MCNC: 92 MG/DL (ref 70–99)
HCT VFR BLD AUTO: 34.7 % (ref 35–48)
HGB BLD-MCNC: 11.1 G/DL (ref 12–16)
LYMPHOCYTES # BLD: 0.8 K/UL (ref 1–4)
LYMPHOCYTES NFR BLD: 22 %
MCH RBC QN AUTO: 27.7 PG (ref 27–32)
MCHC RBC AUTO-ENTMCNC: 31.9 G/DL (ref 32–37)
MCV RBC AUTO: 87 FL (ref 80–100)
MONOCYTES # BLD: 0.5 K/UL (ref 0–1)
MONOCYTES NFR BLD: 13 %
NEUTROPHILS # BLD AUTO: 2.2 K/UL (ref 1.8–7.7)
NEUTROPHILS NFR BLD: 60 %
OSMOLALITY UR CALC.SUM OF ELEC: 283 MOSM/KG (ref 275–295)
PLATELET # BLD AUTO: 164 K/UL (ref 140–400)
PMV BLD AUTO: 8.2 FL (ref 7.4–10.3)
POTASSIUM SERPL-SCNC: 3.9 MMOL/L (ref 3.3–5.1)
RBC # BLD AUTO: 3.99 M/UL (ref 3.7–5.4)
SODIUM SERPL-SCNC: 138 MMOL/L (ref 136–144)
WBC # BLD AUTO: 3.6 K/UL (ref 4–11)

## 2018-08-24 PROCEDURE — 97607 NEG PRS WND THR NDME<=50SQCM: CPT

## 2018-08-24 PROCEDURE — 85025 COMPLETE CBC W/AUTO DIFF WBC: CPT | Performed by: HOSPITALIST

## 2018-08-24 PROCEDURE — 87070 CULTURE OTHR SPECIMN AEROBIC: CPT | Performed by: SURGERY

## 2018-08-24 PROCEDURE — 87075 CULTR BACTERIA EXCEPT BLOOD: CPT | Performed by: SURGERY

## 2018-08-24 PROCEDURE — 87176 TISSUE HOMOGENIZATION CULTR: CPT | Performed by: SURGERY

## 2018-08-24 PROCEDURE — A4216 STERILE WATER/SALINE, 10 ML: HCPCS

## 2018-08-24 PROCEDURE — 80048 BASIC METABOLIC PNL TOTAL CA: CPT | Performed by: HOSPITALIST

## 2018-08-24 PROCEDURE — 0JBP0ZZ EXCISION OF LEFT LOWER LEG SUBCUTANEOUS TISSUE AND FASCIA, OPEN APPROACH: ICD-10-PCS | Performed by: SURGERY

## 2018-08-24 PROCEDURE — A4216 STERILE WATER/SALINE, 10 ML: HCPCS | Performed by: HOSPITALIST

## 2018-08-24 PROCEDURE — 87205 SMEAR GRAM STAIN: CPT | Performed by: SURGERY

## 2018-08-24 PROCEDURE — 0Y9J0ZZ DRAINAGE OF LEFT LOWER LEG, OPEN APPROACH: ICD-10-PCS | Performed by: SURGERY

## 2018-08-24 RX ORDER — MORPHINE SULFATE 4 MG/ML
2 INJECTION, SOLUTION INTRAMUSCULAR; INTRAVENOUS EVERY 10 MIN PRN
Status: DISCONTINUED | OUTPATIENT
Start: 2018-08-24 | End: 2018-08-24 | Stop reason: HOSPADM

## 2018-08-24 RX ORDER — MORPHINE SULFATE 4 MG/ML
4 INJECTION, SOLUTION INTRAMUSCULAR; INTRAVENOUS EVERY 10 MIN PRN
Status: DISCONTINUED | OUTPATIENT
Start: 2018-08-24 | End: 2018-08-24 | Stop reason: HOSPADM

## 2018-08-24 RX ORDER — ENOXAPARIN SODIUM 100 MG/ML
40 INJECTION SUBCUTANEOUS DAILY
Status: DISCONTINUED | OUTPATIENT
Start: 2018-08-24 | End: 2018-08-24

## 2018-08-24 RX ORDER — HYDROCODONE BITARTRATE AND ACETAMINOPHEN 5; 325 MG/1; MG/1
1 TABLET ORAL AS NEEDED
Status: DISCONTINUED | OUTPATIENT
Start: 2018-08-24 | End: 2018-08-24 | Stop reason: HOSPADM

## 2018-08-24 RX ORDER — ONDANSETRON 2 MG/ML
4 INJECTION INTRAMUSCULAR; INTRAVENOUS ONCE AS NEEDED
Status: DISCONTINUED | OUTPATIENT
Start: 2018-08-24 | End: 2018-08-24 | Stop reason: HOSPADM

## 2018-08-24 RX ORDER — MIDAZOLAM HYDROCHLORIDE 1 MG/ML
INJECTION INTRAMUSCULAR; INTRAVENOUS AS NEEDED
Status: DISCONTINUED | OUTPATIENT
Start: 2018-08-24 | End: 2018-08-24 | Stop reason: SURG

## 2018-08-24 RX ORDER — MORPHINE SULFATE 2 MG/ML
2 INJECTION, SOLUTION INTRAMUSCULAR; INTRAVENOUS ONCE AS NEEDED
Status: ACTIVE | OUTPATIENT
Start: 2018-08-24 | End: 2018-08-24

## 2018-08-24 RX ORDER — HALOPERIDOL 5 MG/ML
0.25 INJECTION INTRAMUSCULAR ONCE AS NEEDED
Status: DISCONTINUED | OUTPATIENT
Start: 2018-08-24 | End: 2018-08-24 | Stop reason: HOSPADM

## 2018-08-24 RX ORDER — MORPHINE SULFATE 10 MG/ML
6 INJECTION, SOLUTION INTRAMUSCULAR; INTRAVENOUS EVERY 10 MIN PRN
Status: DISCONTINUED | OUTPATIENT
Start: 2018-08-24 | End: 2018-08-24 | Stop reason: HOSPADM

## 2018-08-24 RX ORDER — NALOXONE HYDROCHLORIDE 0.4 MG/ML
80 INJECTION, SOLUTION INTRAMUSCULAR; INTRAVENOUS; SUBCUTANEOUS AS NEEDED
Status: DISCONTINUED | OUTPATIENT
Start: 2018-08-24 | End: 2018-08-24 | Stop reason: HOSPADM

## 2018-08-24 RX ORDER — SODIUM CHLORIDE, SODIUM LACTATE, POTASSIUM CHLORIDE, CALCIUM CHLORIDE 600; 310; 30; 20 MG/100ML; MG/100ML; MG/100ML; MG/100ML
INJECTION, SOLUTION INTRAVENOUS CONTINUOUS
Status: DISCONTINUED | OUTPATIENT
Start: 2018-08-24 | End: 2018-08-24 | Stop reason: HOSPADM

## 2018-08-24 RX ORDER — BUPIVACAINE HYDROCHLORIDE AND EPINEPHRINE 5; 5 MG/ML; UG/ML
INJECTION, SOLUTION PERINEURAL AS NEEDED
Status: DISCONTINUED | OUTPATIENT
Start: 2018-08-24 | End: 2018-08-24 | Stop reason: HOSPADM

## 2018-08-24 RX ORDER — DEXTROSE MONOHYDRATE 25 G/50ML
50 INJECTION, SOLUTION INTRAVENOUS
Status: DISCONTINUED | OUTPATIENT
Start: 2018-08-24 | End: 2018-08-24 | Stop reason: HOSPADM

## 2018-08-24 RX ORDER — HYDROCODONE BITARTRATE AND ACETAMINOPHEN 5; 325 MG/1; MG/1
2 TABLET ORAL AS NEEDED
Status: DISCONTINUED | OUTPATIENT
Start: 2018-08-24 | End: 2018-08-24 | Stop reason: HOSPADM

## 2018-08-24 RX ORDER — FAMOTIDINE 20 MG/1
20 TABLET ORAL ONCE
Status: DISCONTINUED | OUTPATIENT
Start: 2018-08-24 | End: 2018-08-24 | Stop reason: HOSPADM

## 2018-08-24 RX ORDER — ENOXAPARIN SODIUM 100 MG/ML
40 INJECTION SUBCUTANEOUS DAILY
Status: DISCONTINUED | OUTPATIENT
Start: 2018-08-25 | End: 2018-08-28

## 2018-08-24 RX ORDER — MORPHINE SULFATE 4 MG/ML
4 INJECTION, SOLUTION INTRAMUSCULAR; INTRAVENOUS ONCE
Status: COMPLETED | OUTPATIENT
Start: 2018-08-24 | End: 2018-08-24

## 2018-08-24 RX ORDER — SODIUM CHLORIDE 9 MG/ML
INJECTION, SOLUTION INTRAVENOUS CONTINUOUS
Status: DISCONTINUED | OUTPATIENT
Start: 2018-08-24 | End: 2018-08-28

## 2018-08-24 RX ADMIN — ONDANSETRON 4 MG: 2 INJECTION INTRAMUSCULAR; INTRAVENOUS at 08:20:00

## 2018-08-24 RX ADMIN — SODIUM CHLORIDE: 9 INJECTION, SOLUTION INTRAVENOUS at 08:03:00

## 2018-08-24 RX ADMIN — MIDAZOLAM HYDROCHLORIDE 2 MG: 1 INJECTION INTRAMUSCULAR; INTRAVENOUS at 07:48:00

## 2018-08-24 NOTE — CONSULTS
[address] [facility] [place] REPORT    Deborah Cano Chandan  ITT:[id number]  LOS:1    Date of Admission:  8/22/2018  Date of Consult:  8/23/2018     Reason for Consultation: Left leg hematoma      History of Present Illness:  Alli Schneider Hypertension Mother    • Breast Cancer Sister      age 39   • Breast Cancer Maternal Grandmother      age 76      reports that she has never smoked. She has never used smokeless tobacco. She reports that she drinks alcohol.  She reports that she does not us Application, 1 Application, Topical, Nightly  •  DULoxetine HCl (CYMBALTA) DR particles cap 30 mg, 30 mg, Oral, Daily  •  DULoxetine HCl (CYMBALTA) DR particles cap 60 mg, 60 mg, Oral, Daily  •  fentaNYL (DURAGESIC) 100 MCG/HR 1 patch, 1 patch, Transdermal guarding  Extremities: Left calf erythema with a swelling measuring about 6-7 cm more transversely oriented in the middle of her anterior calf with some patchy purplish discoloration of the skin    Laboratory Data:  Recent Labs   Lab  08/22/18   0458  08/2 Patient Active Problem List:     CHRONIC AIRWAY OBSTRUCTION     ASTHMA     Type 2 diabetes mellitus (HCC)     Cellulitis of leg, right     Venous (peripheral) insufficiency     Panniculitis, unspecified site     Vitamin B 12 deficiency     Pannus, abdomi with patient:  15 Minutes

## 2018-08-24 NOTE — PLAN OF CARE
Problem: Patient Centered Care  Goal: Patient preferences are identified and integrated in the patient's plan of care  Interventions:  - What would you like us to know as we care for you? \"I have a service dog.  He is 17y/o, named Snoopy\"  - Provide timel injury  INTERVENTIONS:  - Assess pt frequently for physical needs  - Identify cognitive and physical deficits and behaviors that affect risk of falls.   - [place] fall precautions as indicated by assessment.  - Educate pt/family on patient safety includin

## 2018-08-24 NOTE — OPERATIVE REPORT
[facility] OPERATIVE REPORT:     PATIENT NAME: Emmanuel Watkins  : 1960   CSN: [id number]    DATE OF OPERATION:   18    PREOPERATIVE DIAGNOSIS: Left lower extremity large hematoma     POSTOPERATIVE DIAGNOSIS: Same     PROCEDURE PERFORMED: incision and d

## 2018-08-24 NOTE — ANESTHESIA POSTPROCEDURE EVALUATION
Patient: Devon Severino    Procedure Summary     Date:  08/24/18 Room / Location:  [address] [place] [address] MAIN OR 06 / [address] [place] [address] MAIN OR    Anesthesia Start:  2232 Anesthesia Stop:      Procedure:  EXTREMITY LOWER INCISION & DRAINAGE (Left Lower Leg) Diagnosis:       Cellulitis of

## 2018-08-24 NOTE — ANESTHESIA PREPROCEDURE EVALUATION
Anesthesia PreOp Note    HPI:     Cheli Persaud is a 63year old female who presents for preoperative consultation requested by: Beau Menjivar MD    Date of Surgery: 8/22/2018 - 8/24/2018    Procedure(s):  EXTREMITY LOWER INCISION & DRAINAGE  Indicat Date Noted: 07/14/2016      Cervical radiculitis         Date Noted: 06/22/2016      Left rotator cuff tear arthropathy         Date Noted: 04/27/2016      Tear of left rotator cuff, unspecified tear extent         Date Noted: 04/20/2016      Closed fractu surgeries for face,                knee hands      Prescriptions Prior to Admission:  [START ON 9/2/2018] ALPRAZolam 1 MG Oral Tab TAKE 1 TABLET BY MOUTH 4 TIMES A DAY WITH MEALS AND NIGHTLY AS NEEDED Disp: 135 tablet Rfl: 1 Taking   Sulfamethoxazole-TMP D Loperamide HCl 2 MG Oral Tab Take 1 tablet (2 mg total) by mouth 4 (four) times daily as needed for Diarrhea.  Disp: 90 tablet Rfl: 0 Taking   methylPREDNISolone 4 MG Oral Tablet Therapy Pack Use as directed for 6 days Disp: 21 tablet Rfl: 0 Taking   suzi Soln Take 3 ML (2.5 MG TOTAL) by nebulization every 6 (six) hours as needed for wheezing/cough Disp: 75 mL Rfl: 0 Taking   OxyCODONE HCl IR 15 MG Oral Tab Take 1-2 tablets (15-30 mg total) by mouth every 6 (six) hours as needed for Pain.  Disp: 60 tablet Rf 6HOURS AS NEEDED FOR NAUSEA.) Disp: 20 tablet Rfl: 4 Taking   MAGNESIUM OXIDE 400 (241.3 MG) MG Oral Tab TAKE ONE TABLET BY MOUTH EVERY DAY Disp: 90 tablet Rfl: 1 Taking   Polyethylene Glycol 3350 (GLYCOLAX) Oral Powder Take 17 g by mouth daily as needed. MAGNESIA) 400 MG/5ML suspension 30 mL 30 mL Oral Daily PRN Cathy Tinsley DO     [MAR Hold] bisacodyl (DULCOLAX) rectal suppository 10 mg 10 mg Rectal Daily PRN Cathy Tinsley DO     [MAR Hold] acetaminophen (TYLENOL) tab 650 mg 650 mg Oral Q6H PRN Katherine Tinsley tab 100 mg 100 mg Oral Daily Renetta Matute  mg at 08/23/18 0827    [MAR Hold] Fluticasone Propionate (FLONASE) 50 MCG/ACT nasal spray 2 spray 2 spray Nasal Daily Renetta Matute MD 2 spray at 08/23/18 0828    [facility] Hold] gabapentin (NEUR Breast Cancer Sister      age 39   • Breast Cancer Maternal Grandmother      age 76       Social History  Social History   Marital status: Single  Spouse name: N/A    Years of education: N/A  Number of children: N/A     Occupational History  None on file Classification: I  ECG reviewed  Rhythm: regular  Rate: normal    Neuro/Psych    (+) neuromuscular disease,     Comments: Fibromyalgia     GI/Hepatic/Renal      Endo/Other    (+) diabetes mellitus type 2 well controlled,   Abdominal  - normal exam    Abdom

## 2018-08-24 NOTE — PROGRESS NOTES
[facility] AND [facility] Infectious Disease Progress Note    Shante Euceda Patient Status:  Inpatient    1960 MRN [id number]   Location [address] [place] [address] Attending iH Hernandez, [address] [facility] Day # 2 PCP Reina Elizondo MD     Subjective:  Patient se Inhalation, Daily  •  ALPRAZolam (XANAX) tab 1 mg, 1 mg, Oral, QID PRN  •  atorvastatin (LIPITOR) tab 10 mg, 10 mg, Oral, Nightly  •  bisacodyl (DULCOLAX) EC tab 5 mg, 5 mg, Oral, Daily PRN  •  Ciclopirox 8 % SOLN 1 Application, 1 Application, Topical, Nig Soft, non-distended, non-tender, with no rebound or guarding. No peritoneal signs. No ascites. Liver is within normal limits. Spleen is not palpable. Extremities:  No lower extremity edema noted. Without clubbing or cyanosis.     Skin: Normal texture MD  8/24/2018  6:08 PM

## 2018-08-24 NOTE — PROGRESS NOTES
[facility] Hospitalist Progress Note     CC: Hospital Follow up    PCP: Louie Ramos MD       Assessment/Plan:   Ms. Parish Ortega is a 61yo female with hx of DDD, possible RA, anemia, anxiety disorder, PTSD, prior c.diff, arthritis, fibromyalgia, chronic opioid de folds  -mycostatin powder     FEN:  - IVF:none  - Diet:general  - Lytes:in am     DVT Prophy:lovenox  Dispo: pending clinical course     Patient and/or patient's family given opportunity to ask questions and note understanding and agree with therapeutic pl 08/22/18   1615  08/23/18   0506  08/24/18   0541   GLU  84  76  92   BUN  5*  5*  5*   CREATSERUM  0.66  0.53  0.60   GFRAA  >60  >60  >60   GFRNAA  >60  >60  >60   CA  8.9  8.6  8.7   NA  134*  137  138   K  3.9  4.0  3.9   CL  99  107  101   CO2  29  21 • gabapentin  600 mg Oral TID AC and HS   • lisinopril  10 mg Oral BID   • Montelukast Sodium  10 mg Oral Daily   • Pantoprazole Sodium  40 mg Oral Daily   • spironolactone  25 mg Oral Daily   • torsemide  20 mg Oral Daily     • sodium chloride 20 mL/hr

## 2018-08-24 NOTE — RESPIRATORY THERAPY NOTE
BRIANA ASSESSMENT:    Pt does have a previous diagnosis of BRIANA. Pt does not routinely use a CPAP device at home.  Pt refused to use cpap

## 2018-08-24 NOTE — PLAN OF CARE
Problem: Patient Centered Care  Goal: Patient preferences are identified and integrated in the patient's plan of care  Interventions:  - What would you like us to know as we care for you? \"I have a service dog.  He is 17y/o, named Snoopy\"  - Provide timel appropriate   Outcome: Progressing  PRN pain meds.   Pt does leg stretching exercises    Problem: SAFETY ADULT - FALL  Goal: Free from fall injury  INTERVENTIONS:  - Assess pt frequently for physical needs  - Identify cognitive and physical deficits and beh

## 2018-08-24 NOTE — ANESTHESIA PROCEDURE NOTES
Airway  Date/Time: 8/24/2018 8:15 AM  Urgency: elective    Airway not difficult    General Information and Staff    Patient location during procedure: OR  Anesthesiologist: CHAVA HAILE  Performed: anesthesiologist     Indications and Patient Condition  Ind

## 2018-08-24 NOTE — PROGRESS NOTES
Received patient this evening very tearful, anxious, and emotionally labile. Patient reported that anxiety increased after speaking with surgeon about surgery today. Patient was on multiple phone calls with family members throughout the evening.   This wr were any further questions for the surgeon.   The patient became defensive and stated \"Why didn't you just tell her all of this out there, I already told you I would sign it\"  This writer educated the patient that both RNs must be present together while r

## 2018-08-24 NOTE — CM/SW NOTE
Met with patient at bedside to explain the BPCI/Medicare program. Patient agreed with phone follow up for 3 months from [address] [place] [address] after discharge from [facility]. Patient was enrolled under . BPCI/Medicare letter and brochure provided.     Didier Rojo

## 2018-08-24 NOTE — WOUND PROGRESS NOTE
WOUND CARE NOTE      PLAN   Recommendations:  Dr. Rajani Corral and ID are following the pt.    Dietary consult for recommendations for nutrition to optimize wound healing  VAC standing orders  VAC troubleshooting instructions on the machine  Staff to monitor the left medial leg, non bony area, the vac is on with a seal at 125 mmHg cont suction, the pt. tolerated the dressing application well.  Spoke with the pt's nurse, will start home vac paperwork, the pt. has a service dog that is in the room with her and th

## 2018-08-24 NOTE — CM/SW NOTE
Addend 402p:     Dr. Obregon Speaker stated she will order psych for PAS screen needs. SW paged Dr. Obregon Speaker that pt will not need PAS screen per psych liaison, Angel Amin. Psych consult not needed.    ---------------------------------------------------------------------

## 2018-08-25 LAB
ANION GAP SERPL CALC-SCNC: 6 MMOL/L (ref 0–18)
BASOPHILS # BLD: 0 K/UL (ref 0–0.2)
BASOPHILS NFR BLD: 1 %
BUN SERPL-MCNC: 3 MG/DL (ref 8–20)
BUN/CREAT SERPL: 5.9 (ref 10–20)
CALCIUM SERPL-MCNC: 8.8 MG/DL (ref 8.5–10.5)
CHLORIDE SERPL-SCNC: 105 MMOL/L (ref 95–110)
CO2 SERPL-SCNC: 26 MMOL/L (ref 22–32)
CREAT SERPL-MCNC: 0.51 MG/DL (ref 0.5–1.5)
EOSINOPHIL # BLD: 0.2 K/UL (ref 0–0.7)
EOSINOPHIL NFR BLD: 6 %
ERYTHROCYTE [DISTWIDTH] IN BLOOD BY AUTOMATED COUNT: 14.8 % (ref 11–15)
GLUCOSE SERPL-MCNC: 103 MG/DL (ref 70–99)
HCT VFR BLD AUTO: 34.7 % (ref 35–48)
HGB BLD-MCNC: 10.7 G/DL (ref 12–16)
LYMPHOCYTES # BLD: 0.8 K/UL (ref 1–4)
LYMPHOCYTES NFR BLD: 22 %
MCH RBC QN AUTO: 27.4 PG (ref 27–32)
MCHC RBC AUTO-ENTMCNC: 30.8 G/DL (ref 32–37)
MCV RBC AUTO: 89 FL (ref 80–100)
MONOCYTES # BLD: 0.3 K/UL (ref 0–1)
MONOCYTES NFR BLD: 9 %
NEUTROPHILS # BLD AUTO: 2.1 K/UL (ref 1.8–7.7)
NEUTROPHILS NFR BLD: 62 %
OSMOLALITY UR CALC.SUM OF ELEC: 281 MOSM/KG (ref 275–295)
PLATELET # BLD AUTO: 172 K/UL (ref 140–400)
PMV BLD AUTO: 7.9 FL (ref 7.4–10.3)
POTASSIUM SERPL-SCNC: 4 MMOL/L (ref 3.3–5.1)
RBC # BLD AUTO: 3.89 M/UL (ref 3.7–5.4)
SODIUM SERPL-SCNC: 137 MMOL/L (ref 136–144)
VANCOMYCIN TROUGH SERPL-MCNC: 8.3 MCG/ML (ref 10–20)
WBC # BLD AUTO: 3.5 K/UL (ref 4–11)

## 2018-08-25 PROCEDURE — 85025 COMPLETE CBC W/AUTO DIFF WBC: CPT | Performed by: HOSPITALIST

## 2018-08-25 PROCEDURE — 80048 BASIC METABOLIC PNL TOTAL CA: CPT | Performed by: HOSPITALIST

## 2018-08-25 PROCEDURE — A4216 STERILE WATER/SALINE, 10 ML: HCPCS | Performed by: HOSPITALIST

## 2018-08-25 PROCEDURE — 80202 ASSAY OF VANCOMYCIN: CPT | Performed by: HOSPITALIST

## 2018-08-25 NOTE — PROGRESS NOTES
[facility] dosing service    Follow-up Pharmacokinetic Consult for Vancomycin Dosing     Rashida Hayden is a 63year old female who is being treated for cellulitis. Patient is on day 4 of Vancomycin 1.25 gm IV Q 12 hours. Goal trough is 10-15 ug/mL. N/A   2. BLOOD CULTURE Status: None (Preliminary result)   Collection Time: 08/22/18 4:42 PM   Result Value Ref Range   Blood Culture Result No Growth 2 Days N/A       Based on the above:    1.  Increase Vancomycin at 1.25 gm IVPB Q 12 hours (based on Troug

## 2018-08-25 NOTE — PLAN OF CARE
PAIN - ADULT    • Verbalizes/displays adequate comfort level or patient's stated pain goal Progressing        Patient Centered Care    • Patient preferences are identified and integrated in the patient's plan of care-patient's \"service dog\" at bedside.

## 2018-08-25 NOTE — PROGRESS NOTES
[facility] Hospitalist Progress Note     CC: Hospital Follow up    PCP: Libby Tom MD       Assessment/Plan:   Ms. Anshul Vidales is a 59yo female with hx of DDD, possible RA, anemia, anxiety disorder, PTSD, prior c.diff, arthritis, fibromyalgia, chronic opioid de o/p     S/p gastric bypass/lap band  -typically eats only pureed or soft food    Candida of skin folds  -myconazole powder     FEN:  - IVF:none  - Diet:general  - Lytes:in am     DVT Prophy:lovenox  Dispo: pending clinical course     Patient and/or patient RDW  14.5  13.9  14.8   WBC  4.1  3.6*  3.5*   PLT  135*  164  172         Recent Labs   Lab  08/23/18   0506  08/24/18   0541  08/25/18   0726   GLU  76  92  103*   BUN  5*  5*  3*   CREATSERUM  0.53  0.60  0.51   GFRAA  >60  >60  >60   GFRNAA  >60  >60 Q72H   • fluconazole  100 mg Oral Daily   • Fluticasone Propionate  2 spray Nasal Daily   • gabapentin  600 mg Oral TID AC and HS   • lisinopril  10 mg Oral BID   • Montelukast Sodium  10 mg Oral Daily   • Pantoprazole Sodium  40 mg Oral Daily   • spironol

## 2018-08-25 NOTE — PROGRESS NOTES
[facility]    General Surgery Progress Note  Pablito Russell  : 1960  CSN: [id number]  HD# 3    Subjective:   POD#1 debridement of left calf hematoma and devitalized skin   No unusual complaints   Passing flatus and BM(s)    Exam: Results  Component Value Date   WBC 3.5 08/25/2018   HGB 10.7 08/25/2018   HCT 34.7 08/25/2018    08/25/2018    08/25/2018   K 4.0 08/25/2018    08/25/2018   CO2 26 08/25/2018   BUN 3 08/25/2018   CREATSERUM 0.51 08/25/2018    08/

## 2018-08-25 NOTE — PROGRESS NOTES
[facility] AND [facility] Infectious Disease Progress Note    Juana Drain Patient Status:  Inpatient    1960 MRN [id number]   Location [facility] Attending Cain Washington, [address] [facility] Day # 3 PCP Alejandro Jerry MD     Subjective:  Pt complai (DULCOLAX) EC tab 5 mg, 5 mg, Oral, Daily PRN  •  Ciclopirox 8 % SOLN 1 Application, 1 Application, Topical, Nightly  •  DULoxetine HCl (CYMBALTA) DR particles cap 30 mg, 30 mg, Oral, Daily  •  DULoxetine HCl (CYMBALTA) DR particles cap 60 mg, 60 mg, Oral, not palpable. Extremities:  Mild edema    Skin: LLE with wound vac  Neurologic: Cranial nerves are grossly intact. Motor strength and sensory examination is grossly normal.  No focal neurologic deficit.     Labs:    Lab Results  Component Value Date   W

## 2018-08-26 LAB
ANION GAP SERPL CALC-SCNC: 10 MMOL/L (ref 0–18)
BASOPHILS # BLD: 0 K/UL (ref 0–0.2)
BASOPHILS NFR BLD: 1 %
BUN SERPL-MCNC: 5 MG/DL (ref 8–20)
BUN/CREAT SERPL: 9.1 (ref 10–20)
CALCIUM SERPL-MCNC: 8.5 MG/DL (ref 8.5–10.5)
CHLORIDE SERPL-SCNC: 102 MMOL/L (ref 95–110)
CO2 SERPL-SCNC: 24 MMOL/L (ref 22–32)
CREAT SERPL-MCNC: 0.55 MG/DL (ref 0.5–1.5)
EOSINOPHIL # BLD: 0.2 K/UL (ref 0–0.7)
EOSINOPHIL NFR BLD: 6 %
ERYTHROCYTE [DISTWIDTH] IN BLOOD BY AUTOMATED COUNT: 14.5 % (ref 11–15)
GLUCOSE SERPL-MCNC: 79 MG/DL (ref 70–99)
HCT VFR BLD AUTO: 35.8 % (ref 35–48)
HGB BLD-MCNC: 11.1 G/DL (ref 12–16)
LYMPHOCYTES # BLD: 1.2 K/UL (ref 1–4)
LYMPHOCYTES NFR BLD: 31 %
MCH RBC QN AUTO: 27.4 PG (ref 27–32)
MCHC RBC AUTO-ENTMCNC: 30.9 G/DL (ref 32–37)
MCV RBC AUTO: 88.6 FL (ref 80–100)
MONOCYTES # BLD: 0.4 K/UL (ref 0–1)
MONOCYTES NFR BLD: 11 %
NEUTROPHILS # BLD AUTO: 1.9 K/UL (ref 1.8–7.7)
NEUTROPHILS NFR BLD: 51 %
OSMOLALITY UR CALC.SUM OF ELEC: 278 MOSM/KG (ref 275–295)
PLATELET # BLD AUTO: 186 K/UL (ref 140–400)
PMV BLD AUTO: 7.7 FL (ref 7.4–10.3)
POTASSIUM SERPL-SCNC: 3.8 MMOL/L (ref 3.3–5.1)
RBC # BLD AUTO: 4.04 M/UL (ref 3.7–5.4)
SODIUM SERPL-SCNC: 136 MMOL/L (ref 136–144)
WBC # BLD AUTO: 3.8 K/UL (ref 4–11)

## 2018-08-26 PROCEDURE — 80048 BASIC METABOLIC PNL TOTAL CA: CPT | Performed by: HOSPITALIST

## 2018-08-26 PROCEDURE — 85025 COMPLETE CBC W/AUTO DIFF WBC: CPT | Performed by: HOSPITALIST

## 2018-08-26 RX ORDER — ARIPIPRAZOLE 15 MG/1
40 TABLET ORAL ONCE
Status: COMPLETED | OUTPATIENT
Start: 2018-08-26 | End: 2018-08-26

## 2018-08-26 RX ORDER — POTASSIUM CHLORIDE 20 MEQ/1
40 TABLET, EXTENDED RELEASE ORAL ONCE
Status: DISCONTINUED | OUTPATIENT
Start: 2018-08-26 | End: 2018-08-28

## 2018-08-26 NOTE — PROGRESS NOTES
[facility] Hospitalist Progress Note     CC: Hospital Follow up    PCP: Mirtha Paige MD       Assessment/Plan:   Ms. Vinod Lopez is a 59yo female with hx of DDD, possible RA, anemia, anxiety disorder, PTSD, prior c.diff, arthritis, fibromyalgia, chronic opioid de o/p     S/p gastric bypass/lap band  -typically eats only pureed or soft food    Candida of skin folds  -myconazole powder     FEN:  - IVF:none  - Diet:general  - Lytes:in am     DVT Prophy:lovenox  Dispo: pending clinical course     Patient and/or patient 14.8  14.5   WBC  3.6*  3.5*  3.8*   PLT  164  172  186         Recent Labs   Lab  08/24/18   0541  08/25/18   0726  08/26/18   0608   GLU  92  103*  79   BUN  5*  3*  5*   CREATSERUM  0.60  0.51  0.55   GFRAA  >60  >60  >60   GFRNAA  >60  >60  >60   CA  8 acetaminophen, ALPRAZolam, bisacodyl, OxyCODONE HCl IR, TiZANidine HCl, Zolpidem Tartrate

## 2018-08-26 NOTE — PROGRESS NOTES
[facility]    General Surgery Progress Note  Devon Severino  : 1960  CSN: [id number]  HD# 4    Subjective:   POD#2 debridement of left calf hematoma and devitalized skin   No unusual complaints   Passing flatus and BM(s)    Exam: Lab Results  Component Value Date   WBC 3.8 08/26/2018   HGB 11.1 08/26/2018   HCT 35.8 08/26/2018    08/26/2018    08/26/2018   K 3.8 08/26/2018    08/26/2018   CO2 24 08/26/2018   BUN 5 08/26/2018   CREATSERUM 0.55 08/26/2018

## 2018-08-27 LAB
ANION GAP SERPL CALC-SCNC: 9 MMOL/L (ref 0–18)
BASOPHILS # BLD: 0 K/UL (ref 0–0.2)
BASOPHILS NFR BLD: 1 %
BUN SERPL-MCNC: 7 MG/DL (ref 8–20)
BUN/CREAT SERPL: 12.7 (ref 10–20)
CALCIUM SERPL-MCNC: 9 MG/DL (ref 8.5–10.5)
CHLORIDE SERPL-SCNC: 101 MMOL/L (ref 95–110)
CO2 SERPL-SCNC: 30 MMOL/L (ref 22–32)
CREAT SERPL-MCNC: 0.55 MG/DL (ref 0.5–1.5)
EOSINOPHIL # BLD: 0.2 K/UL (ref 0–0.7)
EOSINOPHIL NFR BLD: 6 %
ERYTHROCYTE [DISTWIDTH] IN BLOOD BY AUTOMATED COUNT: 13.5 % (ref 11–15)
GLUCOSE SERPL-MCNC: 103 MG/DL (ref 70–99)
HCT VFR BLD AUTO: 35 % (ref 35–48)
HGB BLD-MCNC: 11 G/DL (ref 12–16)
LYMPHOCYTES # BLD: 1 K/UL (ref 1–4)
LYMPHOCYTES NFR BLD: 27 %
MCH RBC QN AUTO: 27.4 PG (ref 27–32)
MCHC RBC AUTO-ENTMCNC: 31.5 G/DL (ref 32–37)
MCV RBC AUTO: 87 FL (ref 80–100)
MONOCYTES # BLD: 0.5 K/UL (ref 0–1)
MONOCYTES NFR BLD: 13 %
NEUTROPHILS # BLD AUTO: 2 K/UL (ref 1.8–7.7)
NEUTROPHILS NFR BLD: 53 %
OSMOLALITY UR CALC.SUM OF ELEC: 288 MOSM/KG (ref 275–295)
PLATELET # BLD AUTO: 220 K/UL (ref 140–400)
PMV BLD AUTO: 9.1 FL (ref 7.4–10.3)
POTASSIUM SERPL-SCNC: 4.2 MMOL/L (ref 3.3–5.1)
POTASSIUM SERPL-SCNC: 4.2 MMOL/L (ref 3.3–5.1)
RBC # BLD AUTO: 4.03 M/UL (ref 3.7–5.4)
SODIUM SERPL-SCNC: 140 MMOL/L (ref 136–144)
VANCOMYCIN TROUGH SERPL-MCNC: 17.6 MCG/ML (ref 10–20)
WBC # BLD AUTO: 3.7 K/UL (ref 4–11)

## 2018-08-27 PROCEDURE — 97607 NEG PRS WND THR NDME<=50SQCM: CPT

## 2018-08-27 PROCEDURE — 97166 OT EVAL MOD COMPLEX 45 MIN: CPT

## 2018-08-27 PROCEDURE — 85025 COMPLETE CBC W/AUTO DIFF WBC: CPT | Performed by: HOSPITALIST

## 2018-08-27 PROCEDURE — A4216 STERILE WATER/SALINE, 10 ML: HCPCS | Performed by: HOSPITALIST

## 2018-08-27 PROCEDURE — 97535 SELF CARE MNGMENT TRAINING: CPT

## 2018-08-27 PROCEDURE — 84132 ASSAY OF SERUM POTASSIUM: CPT | Performed by: HOSPITALIST

## 2018-08-27 PROCEDURE — 80202 ASSAY OF VANCOMYCIN: CPT | Performed by: HOSPITALIST

## 2018-08-27 PROCEDURE — 97162 PT EVAL MOD COMPLEX 30 MIN: CPT

## 2018-08-27 PROCEDURE — 80048 BASIC METABOLIC PNL TOTAL CA: CPT | Performed by: HOSPITALIST

## 2018-08-27 RX ORDER — FOLIC ACID 1 MG/1
1 TABLET ORAL DAILY
Status: DISCONTINUED | OUTPATIENT
Start: 2018-08-27 | End: 2018-08-28

## 2018-08-27 RX ORDER — SODIUM CHLORIDE 9 MG/ML
INJECTION, SOLUTION INTRAVENOUS
Status: COMPLETED
Start: 2018-08-27 | End: 2018-08-27

## 2018-08-27 RX ORDER — MELATONIN
325
Status: DISCONTINUED | OUTPATIENT
Start: 2018-08-27 | End: 2018-08-28

## 2018-08-27 NOTE — WOUND PROGRESS NOTE
Wound Care Services  Routine vac dressing change to the pt's left lower leg wound, Contact Isolation maintained, the pt. is ok with the dressing change, I instructed her on the vac dressing removal, skin care, vac dressing change.  Nutrition, the pt's servi

## 2018-08-27 NOTE — PROGRESS NOTES
Claudy Cruz is a 63year old female.    Patient presents with:  Pain (neurologic)  Cellulitis (integumentary, infectious)      HPI:    Good spirits with emmotional support dog    REVIEW OF SYSTEMS:   A comprehensive 11 point review of systems was complet non-septic and in NAD. HEENT:  Normocephalic, Nonicteric,Conjunctiva pale  Oropharynx clear, trachea ML. NECK:  Supple, no masses, no lymphadenopathy. LUNGS:  Clear to auscultation b/l, no rhonchi, rales, or wheezes.   CARDIO: RRR Q4/N1, no rubs, clicks, Result Value Ref Range   Lactic Acid 1.3 0.5 - 2.2 mmol/L   -BASIC METABOLIC PANEL (8)   Result Value Ref Range   Glucose 76 70 - 99 mg/dL   Sodium 137 136 - 144 mmol/L   Potassium 4.0 3.3 - 5.1 mmol/L   Chloride 107 95 - 110 mmol/L   CO2 21 (L) 22 - 32 Gap 10 0 - 18 mmol/L   Calculated Osmolality 278 275 - 295 mOsm/kg   GFR, Non-African American >60 >=60   GFR, -American >60 >=60   -BLOOD CULTURE   Result Value Ref Range   Blood Culture Result No Growth 4 Days    -BLOOD CULTURE   Result Value Ref g/dl   RDW 13.9 11.0 - 15.0 %    140 - 400 K/UL   MPV 8.2 7.4 - 10.3 fL   Neutrophil % 60 %   Lymphocyte % 22 %   Monocyte % 13 %   Eosinophil % 4 %   Basophil % 1 %   Neutrophil Absolute 2.2 1.8 - 7.7 K/UL   Lymphocyte Absolute 0.8 (L) 1.0 - 4.0 K/

## 2018-08-27 NOTE — PROGRESS NOTES
[facility]    General Surgery Progress Note  Eun Garcia  : 1960  CSN: [id number]  HD# 5    Subjective:   POD#3 debridement of left calf hematoma and devitalized skin   No unusual complaints   Passing flatus and BM(s)    Exam: Results  Component Value Date   WBC 3.7 08/27/2018   HGB 11.0 08/27/2018   HCT 35.0 08/27/2018    08/27/2018    08/27/2018   K 4.2 08/27/2018   K 4.2 08/27/2018    08/27/2018   CO2 30 08/27/2018   BUN 7 08/27/2018   CREATSERUM 0.55 08/27

## 2018-08-27 NOTE — PLAN OF CARE
Problem: Patient Centered Care  Goal: Patient preferences are identified and integrated in the patient's plan of care  Interventions:  - What would you like us to know as we care for you? \"I have a service dog.  He is 15y/o, named Snoopy\"  - Provide timel physical needs  - Identify cognitive and physical deficits and behaviors that affect risk of falls.   - [place] fall precautions as indicated by assessment.  - Educate pt/family on patient safety including physical limitations  - Instruct pt to call for a

## 2018-08-27 NOTE — PROGRESS NOTES
[facility] Hospitalist Progress Note     CC: Hospital Follow up    PCP: Guera Hendrickson MD       Assessment/Plan:   Ms. Alec Bautista is a 61yo female with hx of DDD, possible RA, anemia, anxiety disorder, PTSD, prior c.diff, arthritis, fibromyalgia, chronic opioid de o/p     S/p gastric bypass/lap band  -typically eats only pureed or soft food    Candida of skin folds  -myconazole powder     FEN:  - IVF:none  - Diet:general  - Lytes:in am     DVT Prophy:lovenox  Dispo: pending clinical course, SNF vs. Home with home he 88.6  87.0   MCH  27.4  27.4  27.4   MCHC  30.8*  30.9*  31.5*   RDW  14.8  14.5  13.5   WBC  3.5*  3.8*  3.7*   PLT  172  186  220         Recent Labs   Lab  08/25/18   0726  08/26/18   0608  08/27/18   0525   GLU  103*  79  103*   BUN  3*  5*  7*   CREAT

## 2018-08-27 NOTE — CM/SW NOTE
Addend 403p:     Mellisa Knott from [facility] informed SW that [facility] are not able to accept due to not being able to accommodate pt's service, but stated they have confirmed that their [place] facility is able to accept pt clinically, financially and a [address] [facility] of [facility] of [facility], [address]

## 2018-08-27 NOTE — PHYSICAL THERAPY NOTE
PHYSICAL THERAPY EVALUATION - INPATIENT     Room Number: 521/521-A  Evaluation Date: 8/27/2018  Type of Evaluation: Initial   Physician Order: PT Eval and Treat    Presenting Problem: Cellulitis of L lower extremity  Reason for Therapy: Mobility Dysfuncti Sub-acute rehabilitation    PLAN  PT Treatment Plan: Bed mobility; Body mechanics; Endurance; Energy conservation;Patient education;Gait training;Neuromuscular re-educate;Strengthening;Transfer training;Balance training  Rehab Potential : Fair  Frequency (Obs HISTORY      Comment: left rotator cuff  No date: OTHER SURGICAL HISTORY      Comment: R ankle and foot surgery  No date: OTHER SURGICAL HISTORY      Comment: multiple reconstructive surgeries for face,                knee hands    HOME SITUATION  Type of pan.    BALANCE  Static Sitting: Not tested  Dynamic Sitting: Not tested  Static Standing: Not tested  Dynamic Standing: Not tested    NEUROLOGICAL FINDINGS  Neurological Findings: Sensation           Sensation: impaired LE       AM-PAC '6-Clicks' INBERTOEN #2  Current Status    Goal #3 Patient will tolerate sitting EOB for 10 minutes requiring Stand by assist to maintain midline neutral position.    Goal #3   Current Status    Goal #4   Current Status Patient will perform pressure relieving positioning techni

## 2018-08-27 NOTE — DIETARY NOTE
ADULT NUTRITION INITIAL ASSESSMENT    Pt is at moderate nutrition risk. Pt does not meet malnutrition criteria.       RECOMMENDATIONS TO MD:  See Nutrition Intervention     NUTRITION DIAGNOSIS/PROBLEM:  Increased nutrient needs of protein related to increa past 336 hrs:   Weight   08/22/18 1929 87.2 kg (192 lb 4.8 oz)   08/22/18 1532 95.3 kg (210 lb 1.6 oz)     Wt Readings from Last 10 Encounters:  08/22/18 : 87.2 kg (192 lb 4.8 oz)  01/10/18 : 95.3 kg (210 lb)  07/20/17 : 102.5 kg (226 lb)  06/23/17 : 98 kg FINDINGS:  - Body Fat/Muscle Mass: mild depletion body fat triceps region and mild depletion muscle mass clavicle region per visual exam.  - Fluid Accumulation: lower extremity edema per RN documentation.  - Skin Integrity: surgical wounds, breakdown and R

## 2018-08-27 NOTE — OCCUPATIONAL THERAPY NOTE
OCCUPATIONAL THERAPY EVALUATION - INPATIENT     Room Number: 521/521-A  Evaluation Date: 8/27/2018  Type of Evaluation: Initial       Physician Order: IP Consult to Occupational Therapy  Reason for Therapy: ADL/IADL Dysfunction and Discharge Planning    OC home; patient is adamant that she must go to [facility] that allows her service dog, Nacho. Recommend rehab [facility] at d/c.     DISCHARGE RECOMMENDATIONS: [facility]      OT Device Recommendations: TBD    PLAN  OT Treatment Plan: Balance activities; ADL training;Functio the past, but was able to manage BADL on her own, patient has a history of multiple falls in the past; is a w/c ambulator    SUBJECTIVE  \"There were two caregivers coming but they were stealing my medications for cash\"    [address] (G-Code): CK    FUNCTIONAL TRANSFER ASSESSMENT        Toilet Transfer: NT  Shower Transfer: NT  Chair Transfer: NT    Bedroom Mobility: NT      FUNCTIONAL ADL ASSESSMENT  Grooming: Setup  Feeding: Setup  Bathing: NT   Toileting: Setup    Education Provided

## 2018-08-27 NOTE — PROGRESS NOTES
[facility] AND [facility] Infectious Disease  Progress Note    Meacham Began Patient Status:  Inpatient    1960 MRN [id number]   Location [address] [place] [address] Attending Clair Chery MD   Hosp Day # 5 PCP Barron Azevedo MD     Subjective:  Patient see chronic pain syndrome  - Narcotic dependence     5. Disposition - inpatient. Continue IV vancomycin and meropanem as Rx. Ok to continue p.o. Vancomycin prophy for now. VAC in place.   Place midline and anticipate d/c to [facility] tomorrow on IV invanz alone f

## 2018-08-27 NOTE — PLAN OF CARE
Impaired Activities of Daily Living    • Achieve highest/safest level of independence in self care Progressing        PAIN - ADULT    • Verbalizes/displays adequate comfort level or patient's stated pain goal Progressing        Patient Centered Care    • P

## 2018-08-28 VITALS
BODY MASS INDEX: 34 KG/M2 | HEART RATE: 74 BPM | WEIGHT: 192.31 LBS | TEMPERATURE: 98 F | DIASTOLIC BLOOD PRESSURE: 68 MMHG | RESPIRATION RATE: 16 BRPM | OXYGEN SATURATION: 97 % | SYSTOLIC BLOOD PRESSURE: 102 MMHG

## 2018-08-28 PROCEDURE — 99211 OFF/OP EST MAY X REQ PHY/QHP: CPT

## 2018-08-28 PROCEDURE — A4216 STERILE WATER/SALINE, 10 ML: HCPCS | Performed by: HOSPITALIST

## 2018-08-28 PROCEDURE — 97530 THERAPEUTIC ACTIVITIES: CPT

## 2018-08-28 PROCEDURE — 36569 INSJ PICC 5 YR+ W/O IMAGING: CPT

## 2018-08-28 PROCEDURE — 02HV33Z INSERTION OF INFUSION DEVICE INTO SUPERIOR VENA CAVA, PERCUTANEOUS APPROACH: ICD-10-PCS | Performed by: HOSPITALIST

## 2018-08-28 PROCEDURE — 76937 US GUIDE VASCULAR ACCESS: CPT

## 2018-08-28 RX ORDER — ALPRAZOLAM 1 MG/1
TABLET ORAL
Qty: 10 TABLET | Refills: 0 | Status: SHIPPED | OUTPATIENT
Start: 2018-09-02 | End: 2018-11-28

## 2018-08-28 RX ORDER — LIDOCAINE HYDROCHLORIDE 10 MG/ML
0.5 INJECTION, SOLUTION INFILTRATION; PERINEURAL ONCE AS NEEDED
Status: DISCONTINUED | OUTPATIENT
Start: 2018-08-28 | End: 2018-08-28

## 2018-08-28 RX ORDER — OXYCODONE HYDROCHLORIDE 15 MG/1
30 TABLET ORAL EVERY 6 HOURS PRN
Qty: 10 TABLET | Refills: 0 | Status: SHIPPED | OUTPATIENT
Start: 2018-08-28 | End: 2019-04-15

## 2018-08-28 RX ORDER — ZOLPIDEM TARTRATE 10 MG/1
TABLET ORAL
Qty: 10 TABLET | Refills: 0 | Status: SHIPPED | OUTPATIENT
Start: 2018-08-28 | End: 2020-06-08

## 2018-08-28 RX ORDER — SODIUM CHLORIDE 0.9 % (FLUSH) 0.9 %
10 SYRINGE (ML) INJECTION AS NEEDED
Status: DISCONTINUED | OUTPATIENT
Start: 2018-08-28 | End: 2018-08-28

## 2018-08-28 RX ORDER — FENTANYL 100 UG/H
PATCH TRANSDERMAL
Qty: 1 PATCH | Refills: 0 | Status: SHIPPED | OUTPATIENT
Start: 2018-08-28 | End: 2019-01-16

## 2018-08-28 NOTE — PLAN OF CARE
Problem: Patient Centered Care  Goal: Patient preferences are identified and integrated in the patient's plan of care  Interventions:  - What would you like us to know as we care for you? \"I have a service dog.  He is 17y/o, named Snoopy\"  - Provide timel physical needs  - Identify cognitive and physical deficits and behaviors that affect risk of falls.   - [place] fall precautions as indicated by assessment.  - Educate pt/family on patient safety including physical limitations  - Instruct pt to call for a

## 2018-08-28 NOTE — DISCHARGE SUMMARY
General Medicine Discharge Summary     Patient ID:  Nell Jeans  63year old  5/5/1960    Admit date: 8/22/2018    Discharge date and time: 8/28/18    Attending Physician: Srinivas Henry MD     Consults: IP CONSULT TO INFECTIOUS DISEASE  IP CONSULT TO [place] fibromyalgia, chronic opioid dependency, COPD/Asthma, previous DVT not on anticoag, obesity S/P gastric surgery/lap band, GERD, Recurrent LE cellulitis on chronic suppressive therapy, sarcoidosis (lung), and multiple orthopedic surgeries who presents with skin folds  -myconazole powder        Operative Procedures: Procedure(s) (LRB):  EXTREMITY LOWER INCISION & DRAINAGE (Left)     Imaging:         Disposition: home    Activity: activity as tolerated  Diet: regular diet  Wound Care: as directed  Code Status: directions you see here. ondansetron 4 MG Tbdp  Commonly known as:  ZOFRAN-ODT  DISSOLVE 1 TABLET (4 MG TOTAL) UNDER THE TONGUE EVERY 8 (EIGHT) HOURS AS NEEDED FOR NAUSEA. What changed:  See the new instructions.      OxyCODONE HCl IR 15 MG Tabs  Commo ergocalciferol 84980 units Caps  Commonly known as:  DRISDOL/VITAMIN D2  take 1 capsule by mouth once a week on sundays     Ferrous Sulfate 325 (65 Fe) MG Tabs  TAKE 1 TABLET BY MOUTH ONCE DAILY FOR IRON     fluconazole 100 MG Tabs  Commonly known as:  D Tbec  Commonly known as:  PROTONIX  Take 1 tablet (40 mg total) by mouth once daily.      Polyethylene Glycol 3350 Powd  Commonly known as:  GLYCOLAX     * PROAIR  (90 Base) MCG/ACT Aers  Generic drug:  Albuterol Sulfate HFA  Inhale 2 puffs into the 1 g 1 g in sodium chloride 0.9 % 100 mL  · fentaNYL 100 MCG/HR Pt72  · OxyCODONE HCl IR 15 MG Tabs  · Vancomycin HCl 50 MG/ML Solr  · Zolpidem Tartrate 10 MG Tabs         FU  Follow-up Information     Zulay Hough MD. Schedule an appointment as soon

## 2018-08-28 NOTE — PHYSICAL THERAPY NOTE
Attempted to see patient for PT- pt in room- procedure being performed. Will return as schedule permits.

## 2018-08-28 NOTE — PROGRESS NOTES
Vascular Access Note  Polymidline  Vascular Access Screening:   Allergies to Lidocaine: no  Allergies to Latex: no  Presence of Pacemaker/Defibrillator: No  Mastectomy with Lymph Node Dissection: No  AV Fistula / AV Graft: No  Dialysis Catheter: No  Central

## 2018-08-28 NOTE — PLAN OF CARE
Impaired Activities of Daily Living    • Achieve highest/safest level of independence in self care Adequate for Discharge        PAIN - ADULT    • Verbalizes/displays adequate comfort level or patient's stated pain goal Adequate for Discharge        Patien

## 2018-08-28 NOTE — CM/SW NOTE
SW was notified the pt. Was not wanting to discharge to [address] [place] [address] as she wants to go home first to get her personal belongings and her wheelchair. ASHLEY met with the pt. At bedside to discuss options.    ASHLEY reached out to [address] [place] [address] and t

## 2018-08-28 NOTE — OCCUPATIONAL THERAPY NOTE
OCCUPATIONAL THERAPY TREATMENT NOTE - INPATIENT        Room Number: 521/521-A                Problem List  Principal Problem:    Cellulitis of left lower extremity      ASSESSMENT   RN provided consent to proceed with treatment; coordinated care with physi is\"    OBJECTIVE  Precautions: Limb alert - right (wound vac)    WEIGHT BEARING RESTRICTION  Weight Bearing Restriction: None        ACTIVITIES OF DAILY LIVING ASSESSMENT  AM-PAC ‘6-Clicks’ Inpatient Daily Activity Short Form  How much help from another p 5 [facility]

## 2018-08-28 NOTE — PHYSICAL THERAPY NOTE
PHYSICAL THERAPY TREATMENT NOTE - INPATIENT     Room Number: 521/521-A       Presenting Problem: Cellulitis of L lower extremity    Problem List  Principal Problem:    Cellulitis of left lower extremity      ASSESSMENT   Patient requiring encouragement to Restriction: None                PAIN ASSESSMENT   Ratin  Location: \"everywhere\"  Management Techniques: Repositioning    BALANCE                                                                                                                     Stat Current Status  Met   Goal #4   Current Status Patient will perform pressure relieving positioning techniques in supine and sitting positions independently.

## 2018-08-28 NOTE — PROGRESS NOTES
[facility] AND [facility] Infectious Disease  Progress Note    Shante Euceda Patient Status:  Inpatient    1960 MRN [id number]   Location [address] [place] [address] Attending Tia Goodman MD   Hosp Day # 6 PCP Reina Elizondo MD     Subjective:  Patient see Magan Mack  [facility] Infectious Disease  [phone]    8/28/2018  1:13 PM

## 2018-08-28 NOTE — CM/SW NOTE
Dr. Suhas Kumar informed SW that pt will be medically stable for discharge today to [facility]. RN informed SW that pt will get PICC placed today and dose of invanz prior to discharge. Discharge time uncertain at this time.  [facility]

## 2018-09-10 ENCOUNTER — TELEPHONE (OUTPATIENT)
Dept: PAIN CLINIC | Facility: HOSPITAL | Age: 58
End: 2018-09-10

## 2018-09-13 ENCOUNTER — HOSPITAL ENCOUNTER (OUTPATIENT)
Facility: HOSPITAL | Age: 58
Setting detail: OBSERVATION
Discharge: HOME HEALTH | End: 2018-09-14
Attending: EMERGENCY MEDICINE | Admitting: INTERNAL MEDICINE
Payer: MEDICARE

## 2018-09-13 DIAGNOSIS — IMO0001: Primary | ICD-10-CM

## 2018-09-13 PROCEDURE — 99285 EMERGENCY DEPT VISIT HI MDM: CPT

## 2018-09-13 RX ORDER — OXYCODONE HYDROCHLORIDE 5 MG/1
15 TABLET ORAL ONCE
Status: COMPLETED | OUTPATIENT
Start: 2018-09-13 | End: 2018-09-14

## 2018-09-14 VITALS
SYSTOLIC BLOOD PRESSURE: 151 MMHG | WEIGHT: 172.31 LBS | OXYGEN SATURATION: 97 % | HEIGHT: 63.6 IN | TEMPERATURE: 98 F | DIASTOLIC BLOOD PRESSURE: 78 MMHG | HEART RATE: 73 BPM | RESPIRATION RATE: 18 BRPM | BODY MASS INDEX: 29.78 KG/M2

## 2018-09-14 PROBLEM — IMO0001: Status: ACTIVE | Noted: 2018-09-14

## 2018-09-14 PROCEDURE — 94640 AIRWAY INHALATION TREATMENT: CPT

## 2018-09-14 PROCEDURE — 99214 OFFICE O/P EST MOD 30 MIN: CPT

## 2018-09-14 PROCEDURE — 80048 BASIC METABOLIC PNL TOTAL CA: CPT | Performed by: HOSPITALIST

## 2018-09-14 PROCEDURE — 85025 COMPLETE CBC W/AUTO DIFF WBC: CPT | Performed by: HOSPITALIST

## 2018-09-14 PROCEDURE — A4216 STERILE WATER/SALINE, 10 ML: HCPCS | Performed by: INTERNAL MEDICINE

## 2018-09-14 PROCEDURE — 96372 THER/PROPH/DIAG INJ SC/IM: CPT

## 2018-09-14 RX ORDER — MONTELUKAST SODIUM 10 MG/1
10 TABLET ORAL
Status: DISCONTINUED | OUTPATIENT
Start: 2018-09-14 | End: 2018-09-14

## 2018-09-14 RX ORDER — ACETAMINOPHEN 325 MG/1
650 TABLET ORAL EVERY 6 HOURS PRN
Status: DISCONTINUED | OUTPATIENT
Start: 2018-09-14 | End: 2018-09-14

## 2018-09-14 RX ORDER — OXYCODONE HYDROCHLORIDE 5 MG/1
30 TABLET ORAL EVERY 6 HOURS PRN
Status: DISCONTINUED | OUTPATIENT
Start: 2018-09-14 | End: 2018-09-14

## 2018-09-14 RX ORDER — METOCLOPRAMIDE 10 MG/1
10 TABLET ORAL
Status: DISCONTINUED | OUTPATIENT
Start: 2018-09-14 | End: 2018-09-14

## 2018-09-14 RX ORDER — LOPERAMIDE HYDROCHLORIDE 2 MG/1
2 CAPSULE ORAL 4 TIMES DAILY PRN
COMMUNITY
End: 2019-01-16

## 2018-09-14 RX ORDER — DOCUSATE SODIUM 100 MG/1
100 CAPSULE, LIQUID FILLED ORAL 2 TIMES DAILY
Status: DISCONTINUED | OUTPATIENT
Start: 2018-09-14 | End: 2018-09-14

## 2018-09-14 RX ORDER — FOLIC ACID 1 MG/1
1 TABLET ORAL DAILY
Status: DISCONTINUED | OUTPATIENT
Start: 2018-09-14 | End: 2018-09-14

## 2018-09-14 RX ORDER — DULOXETIN HYDROCHLORIDE 30 MG/1
30 CAPSULE, DELAYED RELEASE ORAL NIGHTLY
Status: DISCONTINUED | OUTPATIENT
Start: 2018-09-14 | End: 2018-09-14

## 2018-09-14 RX ORDER — ATORVASTATIN CALCIUM 10 MG/1
10 TABLET, FILM COATED ORAL NIGHTLY
Status: DISCONTINUED | OUTPATIENT
Start: 2018-09-14 | End: 2018-09-14

## 2018-09-14 RX ORDER — CEFADROXIL 500 MG/1
500 CAPSULE ORAL 2 TIMES DAILY
Qty: 14 CAPSULE | Refills: 0 | Status: SHIPPED | OUTPATIENT
Start: 2018-09-14 | End: 2018-09-21

## 2018-09-14 RX ORDER — ONDANSETRON 2 MG/ML
4 INJECTION INTRAMUSCULAR; INTRAVENOUS EVERY 6 HOURS PRN
Status: DISCONTINUED | OUTPATIENT
Start: 2018-09-14 | End: 2018-09-14

## 2018-09-14 RX ORDER — POLYETHYLENE GLYCOL 3350 17 G/17G
17 POWDER, FOR SOLUTION ORAL
Status: DISCONTINUED | OUTPATIENT
Start: 2018-09-14 | End: 2018-09-14

## 2018-09-14 RX ORDER — SODIUM CHLORIDE 0.9 % (FLUSH) 0.9 %
3 SYRINGE (ML) INJECTION AS NEEDED
Status: DISCONTINUED | OUTPATIENT
Start: 2018-09-14 | End: 2018-09-14

## 2018-09-14 RX ORDER — TORSEMIDE 20 MG/1
20 TABLET ORAL DAILY
Status: DISCONTINUED | OUTPATIENT
Start: 2018-09-14 | End: 2018-09-14

## 2018-09-14 RX ORDER — POLYETHYLENE GLYCOL 3350 17 G/17G
17 POWDER, FOR SOLUTION ORAL DAILY PRN
Status: DISCONTINUED | OUTPATIENT
Start: 2018-09-14 | End: 2018-09-14

## 2018-09-14 RX ORDER — HYDROCODONE BITARTRATE AND ACETAMINOPHEN 5; 325 MG/1; MG/1
2 TABLET ORAL EVERY 4 HOURS PRN
Status: DISCONTINUED | OUTPATIENT
Start: 2018-09-14 | End: 2018-09-14

## 2018-09-14 RX ORDER — METOCLOPRAMIDE HYDROCHLORIDE 5 MG/ML
10 INJECTION INTRAMUSCULAR; INTRAVENOUS EVERY 8 HOURS PRN
Status: DISCONTINUED | OUTPATIENT
Start: 2018-09-14 | End: 2018-09-14

## 2018-09-14 RX ORDER — FLUTICASONE PROPIONATE 50 MCG
2 SPRAY, SUSPENSION (ML) NASAL DAILY
Status: DISCONTINUED | OUTPATIENT
Start: 2018-09-14 | End: 2018-09-14

## 2018-09-14 RX ORDER — HYDROCODONE BITARTRATE AND ACETAMINOPHEN 5; 325 MG/1; MG/1
1 TABLET ORAL EVERY 4 HOURS PRN
Status: DISCONTINUED | OUTPATIENT
Start: 2018-09-14 | End: 2018-09-14

## 2018-09-14 RX ORDER — MAGNESIUM OXIDE 400 MG (241.3 MG MAGNESIUM) TABLET
400 TABLET
Status: DISCONTINUED | OUTPATIENT
Start: 2018-09-14 | End: 2018-09-14

## 2018-09-14 RX ORDER — TIZANIDINE 2 MG/1
2 TABLET ORAL EVERY 8 HOURS PRN
Status: DISCONTINUED | OUTPATIENT
Start: 2018-09-14 | End: 2018-09-14

## 2018-09-14 RX ORDER — ENOXAPARIN SODIUM 100 MG/ML
40 INJECTION SUBCUTANEOUS DAILY
Status: DISCONTINUED | OUTPATIENT
Start: 2018-09-14 | End: 2018-09-14

## 2018-09-14 RX ORDER — GABAPENTIN 300 MG/1
600 CAPSULE ORAL
Status: DISCONTINUED | OUTPATIENT
Start: 2018-09-14 | End: 2018-09-14

## 2018-09-14 RX ORDER — BISACODYL 10 MG
10 SUPPOSITORY, RECTAL RECTAL
Status: DISCONTINUED | OUTPATIENT
Start: 2018-09-14 | End: 2018-09-14

## 2018-09-14 RX ORDER — FENTANYL 100 UG/H
1 PATCH TRANSDERMAL
Status: DISCONTINUED | OUTPATIENT
Start: 2018-09-15 | End: 2018-09-14

## 2018-09-14 RX ORDER — ACETAMINOPHEN 325 MG/1
650 TABLET ORAL EVERY 4 HOURS PRN
Status: DISCONTINUED | OUTPATIENT
Start: 2018-09-14 | End: 2018-09-14

## 2018-09-14 RX ORDER — ALBUTEROL SULFATE 90 UG/1
2 AEROSOL, METERED RESPIRATORY (INHALATION) EVERY 6 HOURS PRN
Status: DISCONTINUED | OUTPATIENT
Start: 2018-09-14 | End: 2018-09-14

## 2018-09-14 RX ORDER — ALPRAZOLAM 1 MG/1
1 TABLET ORAL EVERY 4 HOURS PRN
Status: DISCONTINUED | OUTPATIENT
Start: 2018-09-14 | End: 2018-09-14

## 2018-09-14 RX ORDER — SULFAMETHOXAZOLE AND TRIMETHOPRIM 800; 160 MG/1; MG/1
1 TABLET ORAL
COMMUNITY
End: 2018-09-26

## 2018-09-14 RX ORDER — PANTOPRAZOLE SODIUM 40 MG/1
40 TABLET, DELAYED RELEASE ORAL
Status: DISCONTINUED | OUTPATIENT
Start: 2018-09-14 | End: 2018-09-14

## 2018-09-14 RX ORDER — SPIRONOLACTONE 25 MG/1
25 TABLET ORAL DAILY
Status: DISCONTINUED | OUTPATIENT
Start: 2018-09-14 | End: 2018-09-14

## 2018-09-14 RX ORDER — ZOLPIDEM TARTRATE 5 MG/1
10 TABLET ORAL NIGHTLY PRN
Status: DISCONTINUED | OUTPATIENT
Start: 2018-09-14 | End: 2018-09-14

## 2018-09-14 RX ORDER — DULOXETIN HYDROCHLORIDE 30 MG/1
60 CAPSULE, DELAYED RELEASE ORAL DAILY
Status: DISCONTINUED | OUTPATIENT
Start: 2018-09-14 | End: 2018-09-14

## 2018-09-14 RX ORDER — LISINOPRIL 10 MG/1
10 TABLET ORAL 2 TIMES DAILY
Status: DISCONTINUED | OUTPATIENT
Start: 2018-09-14 | End: 2018-09-14

## 2018-09-14 RX ORDER — SODIUM PHOSPHATE, DIBASIC AND SODIUM PHOSPHATE, MONOBASIC 7; 19 G/133ML; G/133ML
1 ENEMA RECTAL ONCE AS NEEDED
Status: DISCONTINUED | OUTPATIENT
Start: 2018-09-14 | End: 2018-09-14

## 2018-09-14 NOTE — ED NOTES
When the transport came, this patient started to c/o that she called the nurse twice and no on came. Pt said that she put herself on the rolling chair and went to bathroom. Pt said that nobody brought her the sandwich.    This nurse did'n not see any call l

## 2018-09-14 NOTE — DISCHARGE SUMMARY
General Medicine Discharge Summary     Patient ID:  Mike Claudio  63year old  5/5/1960    Admit date: 9/13/2018    Discharge date and time: 9/14/2018  2:36 PM      Attending Physician: No att. providers found     Consults: [facility] Ms. Glenny Jones is a 61yo female with hx of DDD, possible RA, anemia, anxiety disorder, PTSD, prior c.diff, arthritis, fibromyalgia, chronic opioid dependency, COPD/Asthma, previous DVT not on anticoag, obesity S/P gastric surgery/lap band, GERD, Recurrent LE ce Ms. Glenny Jones is a 59yo female with hx of DDD, possible RA, anemia, anxiety disorder, PTSD, prior c.diff, arthritis, fibromyalgia, chronic opioid dependency, COPD/Asthma, previous DVT not on anticoag, obesity S/P gastric surgery/lap band, GERD, Recurrent LE ce - knee pain, multiple joint pain, hx multiple joint replacements  - continue on home pain regimen: fentanyl, prn oxy, cymbalta  - seeing rheum as o/p, poss recent increase in RF other labs ordered and workup planned, no change in trx thus far     R knee pa DISSOLVE 1 TABLET (4 MG TOTAL) UNDER THE TONGUE EVERY 8 (EIGHT) HOURS AS NEEDED FOR NAUSEA. What changed:  See the new instructions. * This list has 2 medication(s) that are the same as other medications prescribed for you.  Read the directions car 2 sprays by Nasal route daily. INSTILL TWO SPRAYS IN EACH NOSTRIL TWICE DAILY     folic acid 1 MG Tabs  Commonly known as:  FOLVITE  Take 1 tablet (1 mg total) by mouth daily.      gabapentin 600 MG Tabs  Commonly known as:  NEURONTIN  Take 1 tablet (600 mg Take 2 tablets (30 mg total) by mouth every 6 (six) hours as needed for Pain. Pantoprazole Sodium 40 MG Tbec  Commonly known as:  PROTONIX  Take 1 tablet (40 mg total) by mouth once daily.      Polyethylene Glycol 3350 Powd  Commonly known as:  GLYCOLAX Compa Knight MD. Schedule an appointment as soon as possible for a visit in 1 week.     Specialties:  Internal Medicine, PEDIATRICS  Contact information:  [facility] [address]             Otoniel Jensen CONTINUE taking these medications    ADVAIR DISKUS 500-50 MCG/DOSE Aepb  Generic drug:  fluticasone-salmeterol  Inhale 1 puff into the lungs 2 (two) times daily. For 2 weeks then decrease back to 250.      ALPRAZolam 1 MG Tabs  Commonly known as:  PHOEBE Disp pull ups, bladder control pads, bed liners and gloves  Disp Quantity sufficient at least 4 diapers per day, 2 control pads and bed liners per day and 8 gloves per day     JUBLIA 10 % Soln  Generic drug:  Efinaconazole     * Lidocaine 4 % Gel  Apply 1 * albuterol sulfate (2.5 MG/3ML) 0.083% Nebu  Commonly known as:  VENTOLIN  Take 3 ML (2.5 MG TOTAL) by nebulization every 6 (six) hours as needed for wheezing/cough     spironolactone 25 MG Tabs  Commonly known as:  ALDACTONE  Take 1 tablet (25 mg total)

## 2018-09-14 NOTE — PLAN OF CARE
PAIN - ADULT    • Verbalizes/displays adequate comfort level or patient's stated pain goal Adequate for Discharge        Patient Centered Care    • Patient preferences are identified and integrated in the patient's plan of care Adequate for Discharge None

## 2018-09-14 NOTE — ED NOTES
When this nurse came to this pt, she c/o that nobody addressec the pain. As the med was ordered, the pt still c/o that this is what she takes at home.  When Fentanyl patch was noted on pt, it appeared that pt showed the dissatisfaction, why this nurse noted

## 2018-09-14 NOTE — CM/SW NOTE
09/14/18 0800   CM/SW Screening   Patient's Mental Status Alert;Oriented   Patient's [address]   Patient lives with Alone   Patient Status Prior to Admission   Independent with ADLs and Mobility No   Services in place prior to admission Emmy

## 2018-09-14 NOTE — CM/SW NOTE
Pt requesting dog be walked prior to going up to her room. Dog brought for a walk outside by this writer and then brought up to room 325. Water provided for dog and blanket being placed on floor for dog bed.  Pt thankful for assistance in caring for service

## 2018-09-14 NOTE — ED NOTES
According to patient she has been seen at [facility] multiple times past two weeks and no wound care done.

## 2018-09-14 NOTE — CONSULTS
[facility]    CONSULTATION REPORT    Deborah Mcghee Erika  HUT:[id number]  LOS:0    Date of Admission:  9/13/2018  Date of Consult:  9/14/2018     Reason for Consultation: Leg wound      History of Present Illness:  [facility] and saline gel dressing was applied.           History:  Past Medical History:   Diagnosis Date   • Anemia 4/23/2018   • ASTHMA    • BACK PAIN    • CONGESTIVE HEART FAILURE    • COPD (chronic obstructive pulmonary disease) (HCC)    • Fibromyalgia Clindamycin                 Comment:Ineffective for her staph  Dye [Iodine (Topica*        Comment:Nuclear for heart stress test    Medications:     Current Facility-Administered Medications:   •  Normal Saline Flush 0.9 % injection 3 mL, 3 mL, Intraveno (PRINIVIL,ZESTRIL) tab 10 mg, 10 mg, Oral, BID  •  magnesium oxide (MAG-OX) tab 400 mg, 400 mg, Oral, Daily  •  Metoclopramide HCl (REGLAN) tab 10 mg, 10 mg, Oral, TID AC and HS  •  Montelukast Sodium (SINGULAIR) tab 10 mg, 10 mg, Oral, Daily  •  mupirocin 09/14/2018    CREATSERUM 0.78 09/14/2018     09/14/2018    CA 9.2 09/14/2018             Impression and Plan:   Patient Active Problem List:     CHRONIC AIRWAY OBSTRUCTION     ASTHMA     Type 2 diabetes mellitus (HCC)     Cellulitis of leg, right OK to be discharged from surgical standpoint      Aayush Sethi MD   9/14/2018  1:24 PM                Time spent on counseling/coordination of care:  30 Minutes  Total time spent with patient:  15 Minutes

## 2018-09-14 NOTE — H&P
[facility] Hospitalist H&P       CC: Patient presents with:  Wound       PCP: Shefali Del Angel MD    History of Present Illness:   Ms. Blaise Riojas is a 61yo female with hx of DDD, possible RA, anemia, anxiety disorder, PTSD, prior c.diff, arthritis, fibromyalgia, c Head trauma    • Leukopenia 4/23/2018   • OBESITY    • Osteoarthrosis, unspecified whether generalized or localized, unspecified site    • OTHER DISEASES     DJD and chronic pain   • Raynaud disease    • Sarcoidosis         PSH  Past Surgical History:  10/ needed for Pain.  Disp: 10 tablet Rfl: 0   ALPRAZolam 1 MG Oral Tab TAKE 1 TABLET BY MOUTH 4 TIMES A DAY WITH MEALS AND NIGHTLY AS NEEDED Disp: 10 tablet Rfl: 0   Zolpidem Tartrate 10 MG Oral Tab TAKE ONE TABLET BY MOUTH AT BEDTIME AS NEEDED FOR SLEEP Disp: triamcinolone acetonide 0.1 % External Cream Apply one to two times daily to dry areas on arms and legs Disp: 454 g Rfl: 4   torsemide 20 MG Oral Tab Take 1 tablet (20 mg total) by mouth daily.  Disp: 90 tablet Rfl: 1   spironolactone 25 MG Oral Tab Take 3 each Rfl: 4   fluconazole 100 MG Oral Tab Take 1 tablet (100 mg total) by mouth daily.  Disp: 10 tablet Rfl: 0   JUBLIA 10 % External Solution APPLY 3 DROPS (=ONE COAT ) TO AFFECTED TOE NAILS ONCE DAILY Disp:  Rfl: 6   ONDANSETRON 4 MG Oral Tablet Dispers organomegaly.  Non distended   Extremities: Left lower ext would clean, no erythema, pink granulation tissue noted, no signs of infection    Skin: Skin color, texture    Neurologic: Normal strength, no focal deficit appreciated     Diagnostic Data:    CBC/C patient left snf after 24hours  -NO further wound vac, patient unable to care for this  -plan to DC home with home health, and daily dressing changes per wound care with close follow up with wound care center  -patient advised to monitor wound closely for

## 2018-09-14 NOTE — WOUND PROGRESS NOTE
WOUND CARE NOTE      PLAN   Recommendations:  Dr. Jahaira Mcgraw and ID  currently on consult  Turn schedules  Heels elevated using pillows, heel wedge or heel boots to offload heels  To prevent sliding: decrease head of bed and elevate foot of bed as medical received. Encouraged the pt. to elevate her legs and heels. Spoke with the pt's nurse on the vac being off and the dressing change orders. Allergies: Lyrica [Pregabalin]; Pcns [Penicillins]; Adhesive Tape; Clindamycin;  Dye [Iodine (Topical)]    Labs:

## 2018-09-14 NOTE — ED PROVIDER NOTES
Patient Seen in: [facility] Emergency Department    History   Patient presents with:  Wound    Stated Complaint: wound    HPI    Patient is a 26-year-old female who was hospitalized several weeks ago for incision and drainage of a hematoma to her Smoking status: Never Smoker      Smokeless tobacco: Never Used    Alcohol use: Yes      Comment: rare    Drug use: No      Review of Systems    Positive for stated complaint: wound  Other systems are as noted in HPI.   Constitutional and vital signs re We recommend that you schedule follow up care with a primary care provider within the next three months to obtain basic health screening including reassessment of your blood pressure.     Medications Prescribed:  Current Discharge Medication List        Pre

## 2018-09-14 NOTE — CM/SW NOTE
RN/Izzy informed SW that pt is medically stable to discharge today and will need ambulance transport (complete).  ASHLEY spoke w/ Jose Ran from [facility] and arranged ambulance transport to pt's home, [facility] [id number]; ASHLEY informed [facility] th

## 2018-09-14 NOTE — ED INITIAL ASSESSMENT (HPI)
Per patient wound vac has not been on for two weeks. Told by home health nurse to come in. Left lower leg wound from cellulitis. Pt states she had surgery here.

## 2018-09-14 NOTE — CM/SW NOTE
Asked by ED RN to assist Pt in home care needs. Upon entering the room, Pt resting on cart with small service dog on the floor next to cart. Pt had a hematoma evacuated/ I& D done a couple weeks ago here at [address] [place] [address].  Pt was discharged to [facility] from

## 2018-09-14 NOTE — CONSULTS
[facility] Infectious Disease  Report of Consultation    Emmanuel Watkins Patient Status:  Observation    1960 MRN [id number]   Location [facility] 3W/SW Attending Clair Chery MD   Hosp Day # 0 PCP Barron Azevedo MD     Date of at [facility] OR  2000: CHOLECYSTECTOMY  8/24/2018: EXTREMITY LOWER INCISION & DRAINAGE;  Left      Comment:  Performed by Brian Verma MD at [facility] OR  3/1/2018: GENICULAR NERVE BLOCK; Right      Comment:  Performed by Tamara Proctor MD at [address] [place] [address] tab 2 tablet, 2 tablet, Oral, Q4H PRN  •  docusate sodium (COLACE) cap 100 mg, 100 mg, Oral, BID  •  magnesium hydroxide (MILK OF MAGNESIA) 400 MG/5ML suspension 30 mL, 30 mL, Oral, Daily PRN  •  bisacodyl (DULCOLAX) rectal suppository 10 mg, 10 mg, Rectal mg, Oral, Q8H PRN  •  torsemide (DEMADEX) tab 20 mg, 20 mg, Oral, Daily  •  Zolpidem Tartrate (AMBIEN) tab 10 mg, 10 mg, Oral, Nightly PRN    Review of Systems:    Constitutional:  No fevers, chills, diaphoresis, weight changes.    HEENT:  No visual changes Intake/Output:  No intake/output data recorded. Physical Exam:   General: Awake, alert, non-tox and in NAD. Head: Normocephalic, without obvious abnormality, atraumatic. Eyes: Conjunctivae/corneas clear. No scleral icterus.   No conjunctival [phone]    9/14/2018  12:20 PM

## 2019-05-20 PROCEDURE — 86160 COMPLEMENT ANTIGEN: CPT | Performed by: INTERNAL MEDICINE

## 2019-05-20 PROCEDURE — 82164 ANGIOTENSIN I ENZYME TEST: CPT | Performed by: INTERNAL MEDICINE

## 2019-09-28 ENCOUNTER — HOSPITAL ENCOUNTER (EMERGENCY)
Facility: HOSPITAL | Age: 59
Discharge: HOME | End: 2019-09-28
Attending: EMERGENCY MEDICINE
Payer: MEDICARE

## 2019-09-28 VITALS
DIASTOLIC BLOOD PRESSURE: 102 MMHG | HEART RATE: 67 BPM | HEIGHT: 65 IN | OXYGEN SATURATION: 97 % | WEIGHT: 130 LBS | BODY MASS INDEX: 21.66 KG/M2 | SYSTOLIC BLOOD PRESSURE: 167 MMHG | RESPIRATION RATE: 20 BRPM | TEMPERATURE: 98 F

## 2019-09-28 DIAGNOSIS — Z76.0 ENCOUNTER FOR MEDICATION REFILL: Primary | ICD-10-CM

## 2019-09-28 PROCEDURE — 99283 EMERGENCY DEPT VISIT LOW MDM: CPT

## 2019-09-28 PROCEDURE — 96372 THER/PROPH/DIAG INJ SC/IM: CPT

## 2019-09-28 RX ORDER — MORPHINE SULFATE 4 MG/ML
4 INJECTION, SOLUTION INTRAMUSCULAR; INTRAVENOUS ONCE
Status: COMPLETED | OUTPATIENT
Start: 2019-09-28 | End: 2019-09-28

## 2019-09-28 NOTE — ED NOTES
MORPHINE GIVEN IN LEFT THIGH PER ORDER IM. DISCHARGE INSTRUCTIONS GIVEN TO PATIENT. PATIENT LEFT VIA [facility] AND NO DISTRESS.   PATIENT IS AWAKE AND ALERT

## 2019-09-28 NOTE — CM/SW NOTE
Attempted to inform pt that we would get an appointment for her to see Dr Wilberto Darby and to get into the pain center. \"I do not want you to do that. I cannot wait until Monday, I will die by then. I need the medication. \" I told her that Dr Julian Koch needs to see

## 2019-09-28 NOTE — ED PROVIDER NOTES
Patient Seen in: [facility] AND [facility] Emergency Department      History   Patient presents with:  Fall (musculoskeletal, neurologic)    Stated Complaint: out of medications x 1 week, states going through [employer]".     HPI    23-year-old female with his cuff surgery   • OTHER SURGICAL HISTORY      R foot surgery   • OTHER SURGICAL HISTORY      left rotator cuff   • OTHER SURGICAL HISTORY      R ankle and foot surgery   • OTHER SURGICAL HISTORY      multiple reconstructive surgeries for face, knee hands medications. She is neurologically intact and at her baseline functional status. No new complaints. I reviewed her  and it appears that she is getting narcotics from multiple prescribers.   I explained to the patient that I would not be able to refill

## 2019-09-28 NOTE — ED NOTES
Pt refusing to have any appointments made by . Refused vitals again. Requesting transport home. MD aware. Report given to Juan ray RN.

## 2019-09-28 NOTE — ED INITIAL ASSESSMENT (HPI)
Pt assisted to the bathroom in a wheelchair, changed into gown. Pt reports about 8 days ago Bren woman took all my meds from me while I was packing to move\".  Reports she has contacted her primary for more medication but has not received them from her provi

## 2020-01-30 ENCOUNTER — APPOINTMENT (OUTPATIENT)
Dept: GENERAL RADIOLOGY | Facility: HOSPITAL | Age: 60
End: 2020-01-30
Attending: EMERGENCY MEDICINE
Payer: MEDICARE

## 2020-01-30 ENCOUNTER — HOSPITAL ENCOUNTER (EMERGENCY)
Facility: HOSPITAL | Age: 60
Discharge: HOME | End: 2020-01-30
Attending: EMERGENCY MEDICINE
Payer: MEDICARE

## 2020-01-30 ENCOUNTER — APPOINTMENT (OUTPATIENT)
Dept: ULTRASOUND IMAGING | Facility: HOSPITAL | Age: 60
End: 2020-01-30
Attending: EMERGENCY MEDICINE
Payer: MEDICARE

## 2020-01-30 VITALS
DIASTOLIC BLOOD PRESSURE: 99 MMHG | HEART RATE: 80 BPM | OXYGEN SATURATION: 97 % | SYSTOLIC BLOOD PRESSURE: 165 MMHG | TEMPERATURE: 98 F | RESPIRATION RATE: 18 BRPM

## 2020-01-30 DIAGNOSIS — R60.0 LOWER EXTREMITY EDEMA: Primary | ICD-10-CM

## 2020-01-30 DIAGNOSIS — L03.116 CELLULITIS OF LEFT LOWER EXTREMITY: ICD-10-CM

## 2020-01-30 LAB
AMPHET UR QL SCN: NEGATIVE
ANION GAP SERPL CALC-SCNC: 5 MMOL/L (ref 0–18)
BARBITURATES UR QL SCN: NEGATIVE
BASOPHILS # BLD AUTO: 0.06 X10(3) UL (ref 0–0.2)
BASOPHILS NFR BLD AUTO: 1.4 %
BENZODIAZ UR QL SCN: NEGATIVE
BUN BLD-MCNC: 8 MG/DL (ref 7–18)
BUN/CREAT SERPL: 13.8 (ref 10–20)
CALCIUM BLD-MCNC: 9 MG/DL (ref 8.5–10.1)
CANNABINOIDS UR QL SCN: NEGATIVE
CHLORIDE SERPL-SCNC: 107 MMOL/L (ref 98–112)
CO2 SERPL-SCNC: 28 MMOL/L (ref 21–32)
COCAINE UR QL: NEGATIVE
CREAT BLD-MCNC: 0.58 MG/DL (ref 0.55–1.02)
DEPRECATED RDW RBC AUTO: 38.7 FL (ref 35.1–46.3)
EOSINOPHIL # BLD AUTO: 0.03 X10(3) UL (ref 0–0.7)
EOSINOPHIL NFR BLD AUTO: 0.7 %
ERYTHROCYTE [DISTWIDTH] IN BLOOD BY AUTOMATED COUNT: 12.1 % (ref 11–15)
GLUCOSE BLD-MCNC: 107 MG/DL (ref 70–99)
HCT VFR BLD AUTO: 41.2 % (ref 35–48)
HGB BLD-MCNC: 12.7 G/DL (ref 12–16)
IMM GRANULOCYTES # BLD AUTO: 0.01 X10(3) UL (ref 0–1)
IMM GRANULOCYTES NFR BLD: 0.2 %
LYMPHOCYTES # BLD AUTO: 0.94 X10(3) UL (ref 1–4)
LYMPHOCYTES NFR BLD AUTO: 22.3 %
MCH RBC QN AUTO: 27.1 PG (ref 26–34)
MCHC RBC AUTO-ENTMCNC: 30.8 G/DL (ref 31–37)
MCV RBC AUTO: 88 FL (ref 80–100)
MDMA UR QL SCN: NEGATIVE
METHADONE UR QL SCN: NEGATIVE
MONOCYTES # BLD AUTO: 0.47 X10(3) UL (ref 0.1–1)
MONOCYTES NFR BLD AUTO: 11.1 %
NEUTROPHILS # BLD AUTO: 2.71 X10 (3) UL (ref 1.5–7.7)
NEUTROPHILS # BLD AUTO: 2.71 X10(3) UL (ref 1.5–7.7)
NEUTROPHILS NFR BLD AUTO: 64.3 %
OPIATES UR QL SCN: NEGATIVE
OSMOLALITY SERPL CALC.SUM OF ELEC: 289 MOSM/KG (ref 275–295)
OXYCODONE UR QL SCN: NEGATIVE
PCP UR QL SCN: NEGATIVE
PLATELET # BLD AUTO: 232 10(3)UL (ref 150–450)
POTASSIUM SERPL-SCNC: 3.5 MMOL/L (ref 3.5–5.1)
RBC # BLD AUTO: 4.68 X10(6)UL (ref 3.8–5.3)
SODIUM SERPL-SCNC: 140 MMOL/L (ref 136–145)
TROPONIN I SERPL-MCNC: <0.045 NG/ML (ref ?–0.04)
WBC # BLD AUTO: 4.2 X10(3) UL (ref 4–11)

## 2020-01-30 PROCEDURE — 93010 ELECTROCARDIOGRAM REPORT: CPT | Performed by: EMERGENCY MEDICINE

## 2020-01-30 PROCEDURE — 85025 COMPLETE CBC W/AUTO DIFF WBC: CPT | Performed by: EMERGENCY MEDICINE

## 2020-01-30 PROCEDURE — 93005 ELECTROCARDIOGRAM TRACING: CPT

## 2020-01-30 PROCEDURE — 99284 EMERGENCY DEPT VISIT MOD MDM: CPT

## 2020-01-30 PROCEDURE — 93970 EXTREMITY STUDY: CPT | Performed by: EMERGENCY MEDICINE

## 2020-01-30 PROCEDURE — 80048 BASIC METABOLIC PNL TOTAL CA: CPT | Performed by: EMERGENCY MEDICINE

## 2020-01-30 PROCEDURE — 36415 COLL VENOUS BLD VENIPUNCTURE: CPT

## 2020-01-30 PROCEDURE — 71045 X-RAY EXAM CHEST 1 VIEW: CPT | Performed by: EMERGENCY MEDICINE

## 2020-01-30 PROCEDURE — 84484 ASSAY OF TROPONIN QUANT: CPT | Performed by: EMERGENCY MEDICINE

## 2020-01-30 PROCEDURE — 80307 DRUG TEST PRSMV CHEM ANLYZR: CPT | Performed by: EMERGENCY MEDICINE

## 2020-01-30 RX ORDER — SULFAMETHOXAZOLE AND TRIMETHOPRIM 800; 160 MG/1; MG/1
1 TABLET ORAL 2 TIMES DAILY
Qty: 14 TABLET | Refills: 0 | Status: SHIPPED | OUTPATIENT
Start: 2020-01-30 | End: 2020-02-06

## 2020-01-30 RX ORDER — HYDROCODONE BITARTRATE AND ACETAMINOPHEN 5; 325 MG/1; MG/1
1 TABLET ORAL ONCE
Status: COMPLETED | OUTPATIENT
Start: 2020-01-30 | End: 2020-01-30

## 2020-01-30 NOTE — ED INITIAL ASSESSMENT (HPI)
Pt presents today with 'heart problems' after she has been out of her fentanyl and oxy x 2 weeks. States she's an orthopedic patient and needs them daily. States she had an doctors apt today but came here instead.  Also states she cannot afford her meds, th

## 2020-01-30 NOTE — ED PROVIDER NOTES
Patient Seen in: [facility] AND [facility] Emergency Department      History   Patient presents with:  Medication Reaction  Cellulitis    Stated Complaint: \"heart problems\"     HPI    Patient is a 61-year-old female with multiple medical problems.   She states status: Never Smoker      Smokeless tobacco: Never Used    Alcohol use: Yes      Comment: rare    Drug use: No             Review of Systems    Positive for stated complaint: \"heart problems\"   Other systems are as noted in HPI.   Constitutional and vital Abnormal; Notable for the following components:       Result Value    Glucose 107 (*)     All other components within normal limits   CBC W/ DIFFERENTIAL - Abnormal; Notable for the following components:    MCHC 30.8 (*)     Lymphocyte Absolute 0.94 (*) Impression:  Lower extremity edema  (primary encounter diagnosis)  Cellulitis of left lower extremity    Disposition:  Discharge  1/30/2020  1:45 pm    Follow-up:  Angelika Cárdenas MD  [address] [place] [address] [phone]

## 2020-02-27 ENCOUNTER — HOSPITAL ENCOUNTER (EMERGENCY)
Facility: HOSPITAL | Age: 60
Discharge: HOME | End: 2020-02-27
Attending: EMERGENCY MEDICINE
Payer: MEDICARE

## 2020-02-27 VITALS
DIASTOLIC BLOOD PRESSURE: 94 MMHG | SYSTOLIC BLOOD PRESSURE: 135 MMHG | TEMPERATURE: 98 F | HEIGHT: 63 IN | OXYGEN SATURATION: 98 % | WEIGHT: 145 LBS | HEART RATE: 65 BPM | RESPIRATION RATE: 18 BRPM | BODY MASS INDEX: 25.69 KG/M2

## 2020-02-27 DIAGNOSIS — L03.119 CELLULITIS OF LOWER EXTREMITY, UNSPECIFIED LATERALITY: Primary | ICD-10-CM

## 2020-02-27 PROCEDURE — 99283 EMERGENCY DEPT VISIT LOW MDM: CPT

## 2020-02-27 RX ORDER — DOXYCYCLINE HYCLATE 100 MG/1
100 CAPSULE ORAL 2 TIMES DAILY
Qty: 20 CAPSULE | Refills: 0 | Status: SHIPPED | OUTPATIENT
Start: 2020-02-27 | End: 2020-03-08

## 2020-02-27 RX ORDER — DOXYCYCLINE HYCLATE 100 MG/1
100 CAPSULE ORAL ONCE
Status: COMPLETED | OUTPATIENT
Start: 2020-02-27 | End: 2020-02-27

## 2020-02-27 RX ORDER — HYDROCODONE BITARTRATE AND ACETAMINOPHEN 5; 325 MG/1; MG/1
1 TABLET ORAL ONCE
Status: COMPLETED | OUTPATIENT
Start: 2020-02-27 | End: 2020-02-27

## 2020-02-27 NOTE — CM/SW NOTE
LAURY Berman RN obtained quote from [facility] $84 and informed patient of quote amount. Patient ok with the amount and would like to be billed.   This RN called [facility] and spoke with Lew Greenwood and informed him to please bill patient for the $84 per patient req

## 2020-02-27 NOTE — ED PROVIDER NOTES
Patient Seen in: [facility] AND [facility] Emergency Department      History   Patient presents with:  Leg Pain    Stated Complaint: leg pain    HPI    41-year-old female with history of chronic pain syndrome and fibromyalgia as well as history of chronic lower OTHER SURGICAL HISTORY      rot cuff surgery   • OTHER SURGICAL HISTORY      R foot surgery   • OTHER SURGICAL HISTORY      left rotator cuff   • OTHER SURGICAL HISTORY      R ankle and foot surgery   • OTHER SURGICAL HISTORY      multiple reconstructive s above  Psychiatric: Normal mood but somewhat strange affect. Patient does not stop talking is very talkative. She appears quite anxious. She is argumentative at times. Nursing note and vitals reviewed.     Differential diagnosis includes acute on chroni (two) times daily for 10 days.   Qty: 20 capsule Refills: 0

## 2020-02-27 NOTE — ED NOTES
Pt presents to ED for leg pain that has been chronic. Pt states she thinks she has cellulitis. Pt denies fevers. Pt states she always has generalized aches and pains.

## 2020-02-27 NOTE — CM/SW NOTE
Donis Barajas was arranged by ER RN for discharge transportation home.   Spoke with LAURY Berman RN and informed him to please notify patient she will be responsible for Medic[employer] cost - advised LAURY Berman RN to contact [facility] for quote and inform patient of this mirna

## 2020-02-27 NOTE — ED INITIAL ASSESSMENT (HPI)
Patient states having generalized body pain, most concerned for bilateral leg pain. Patient states having history of blood clots.

## 2020-05-11 PROBLEM — J45.41 ASTHMA EXACERBATION, NON-ALLERGIC, MODERATE PERSISTENT: Status: ACTIVE | Noted: 2020-05-11

## 2020-05-11 PROBLEM — J45.41: Status: ACTIVE | Noted: 2020-05-11

## 2021-05-08 PROBLEM — L97.923 ULCER OF LEFT LOWER EXTREMITY WITH NECROSIS OF MUSCLE (HCC): Status: ACTIVE | Noted: 2021-05-08

## 2024-09-10 ENCOUNTER — APPOINTMENT (OUTPATIENT)
Dept: WOUND CARE | Facility: HOSPITAL | Age: 64
End: 2024-09-10
Attending: NURSE PRACTITIONER
Payer: MEDICARE

## (undated) DEVICE — STERILE LATEX POWDER-FREE SURGICAL GLOVESWITH NITRILE COATING: Brand: PROTEXIS

## (undated) DEVICE — CULTURE TUBE ANAEROBIC

## (undated) DEVICE — SOL  .9 1000ML BTL

## (undated) DEVICE — LOWER EXTREMITY: Brand: MEDLINE INDUSTRIES, INC.

## (undated) DEVICE — TRAY SRGPRP PVP IOD WT SCRB SM

## (undated) DEVICE — CULTURE COLLECT/TRANSPORT SYS

## (undated) DEVICE — SOL  .9 3000ML

## (undated) NOTE — LETTER
[facility]  [address]  [place] [address]  [phone]      March 28, 2017    [place] [address]      Dear Wellington Dangelo:    Dr. Pauly Abraham reviewed your recent echo test results and noted i

## (undated) NOTE — IP AVS SNAPSHOT
[facility]            (For Outpatient Use Only) Initial Admit Date: 9/13/2018   Inpt/Obs Admit Date: Inpt: N/A / Obs: 09/14/18   Discharge Date:    Shreya Gallego:  [de-identified]   MRN: [de-identified]   CSN: [id number]        ENCOUNTER  [facility] Account Financial Class: Medicare    September 14, 2018

## (undated) NOTE — LETTER
[facility]  Administration of Anesthesia  1. I, Myesha Torres, or _________________________________ acting on her behalf, (Patient) (Dependent/Representative) request to receive anesthesia for my pending procedure/operation/treatment.   A infections, high spinal block, spinal bleeding, seizure, cardiac arrest and death. 7. AWARENESS: I understand that it is possible (but unlikely) to have explicit memory of events from the operating room while under general anesthesia.   8. ELECTROCONVULSIV unconscious pt /Relationship    My signature below affirms that prior to the time of the procedure, I have explained to the patient and/or his/her guardian, the risks and benefits of undergoing anesthesia, as well as any reasonable alternatives.     _______

## (undated) NOTE — LETTER
[facility]  Administration of Anesthesia  1. I, Shashank Hendrickson, or _________________________________ acting on her behalf, (Patient) (Dependent/Representative) request to receive anesthesia for my pending procedure/operation/treatment.   A infections, high spinal block, spinal bleeding, seizure, cardiac arrest and death. 7. AWARENESS: I understand that it is possible (but unlikely) to have explicit memory of events from the operating room while under general anesthesia.   8. ELECTROCONVULSIV unconscious pt /Relationship    My signature below affirms that prior to the time of the procedure, I have explained to the patient and/or his/her guardian, the risks and benefits of undergoing anesthesia, as well as any reasonable alternatives.     _______

## (undated) NOTE — IP AVS SNAPSHOT
Patient Demographics     Address  [place] [address] Phone  [phone] [facility])  [phone] (Mobile) *Preferred* E-mail Address  [email or web address]      Emergency Contact(s)     Name Relation Home Work [address] [facility] * DULoxetine HCl 60 MG Cpep  Commonly known as:  CYMBALTA  TAKE ONE CAPSULE BY MOUTH ONCE DAILY  What changed:    · how much to take  · how to take this  · when to take this  · additional instructions     ondansetron 4 MG Tbdp  Commonly known as:  Dae Cease PLACE 1 PATCH ONTO THE SKIN EVERY THIRD DAY     Ferrous Sulfate 325 (65 Fe) MG Tabs  TAKE 1 TABLET BY MOUTH ONCE DAILY FOR IRON     Fluticasone Propionate 50 MCG/ACT Susp  Commonly known as:  FLONASE  2 sprays by Nasal route daily.  INSTILL TWO SPRAYS IN EA Take 2 capsules (2 g total) by mouth 2 (two) times daily. OxyCODONE HCl IR 15 MG Tabs  Commonly known as:  ROXICODONE  Take 2 tablets (30 mg total) by mouth every 6 (six) hours as needed for Pain.      Pantoprazole Sodium 40 MG Tbec  Commonly known as: · Cefadroxil 500 MG Caps             Follow-up Information     Valentín Junior MD. Schedule an appointment as soon as possible for a visit in 1 week.     Specialties:  Internal Medicine, PEDIATRICS  Contact information:  [address] Apply 1 Application topically nightly.  Remove with rubbing alcohol once weekly   Sally Cotter MD         Clindamycin Phosphate 1 % Soln  Commonly known as:  CLEOCIN T  Next dose due:  9/14 at 9pm      Apply 1 Application topically 2 (two) times tegan Take 1 tablet (600 mg total) by mouth 4 (four) times daily before meals and nightly.    Alejandro Jerry MD         Incontinence Supplies Misc      Disp pull ups, bladder control pads, bed liners and gloves  Disp Quantity sufficient at least 4 diapers pe [id number] [id number] UNIT/GM Powd  Generic drug:  Nystatin  Next dose due:  9/14 at 9pm      Apply 1 Application topically 2 (two) times daily. Kain Thomas MD         nystatin 786215 UNIT/GM Oint  Commonly known as:  MYCOSTATIN  Next dose due:   Take as Syringe (Disposable) 1 ML Misc  Commonly known as:  BD TUBERCULIN SYRINGE      Use weekly or monthly with B12 for injection   Mat MD Erasto         TiZANidine HCl 2 MG Tabs  Commonly known as:  ZANAFLEX      TAKE 1 TABLET BY MOUTH EVERY 8 HOURS AS [id number] magnesium oxide (MAG-OX) tab 400 mg 09/14/18 1233 Given            LEFT LOWER ABDOMEN     Order ID Medication Name Action Time Action Reason Comments    [id number] Enoxaparin Sodium (LOVENOX) 40 MG/0.4ML injection 40 mg 09/14/18 0918 Given The following orders were created for panel order CBC WITH DIFFERENTIAL WITH PLATELET.   Procedure                               Abnormality         Status                     ---------                               -----------         ------ Hold Lt [facility] Resulted — — [place] [facility]            [facility] [[id number]]  Resulted: 09/13/18 2200, Result status: Final result   Ordering provider:  Sender, Aiwlda Oro MD  09/13/18 2041 Resulting lab:  [place] [facility]    Specimen Information    Type surrounding cellulitis, s/p I and D per gen surgery with placement of wound vac, ID rec continue IV ab's via picc line for 10 more days (IV ertapenem), patient was discharged to [address] [place] [address] for continued ab's and PT, but signed out [place] after 24 ho Comment:  Performed by Deyvi Álvarez MD at [address] [place] [address],[address]  No date: KNEE REPLACEMENT SURGERY;  Right  No date: OTHER SURGICAL HISTORY      Comment:  rot cuff surgery  No date: OTHER SURGICAL HISTORY      Comment:  R foot cyanocobalamin 1000 MCG/ML Injection Solution INJECT 1 ML ONCE A MONTH Disp: 10 mL Rfl: 1   TiZANidine HCl 2 MG Oral Tab TAKE 1 TABLET BY MOUTH EVERY 8 HOURS AS NEEDED FOR PAIN Disp: 90 tablet Rfl: 0   Montelukast Sodium 10 MG Oral Tab Take 1 tablet (10 mg Remove with rubbing alcohol once weekly Disp: 1 Bottle Rfl: 4   Meloxicam 15 MG Oral Tab Take 1 tablet (15 mg total) by mouth once daily. Disp: 90 tablet Rfl: 0   Pantoprazole Sodium 40 MG Oral Tab EC Take 1 tablet (40 mg total) by mouth once daily.  Disp: MAGNESIUM OXIDE 400 (241.3 MG) MG Oral Tab TAKE ONE TABLET BY MOUTH EVERY DAY Disp: 90 tablet Rfl: 1   Polyethylene Glycol 3350 (GLYCOLAX) Oral Powder Take 17 g by mouth daily as needed.    Disp:  Rfl:          Soc Hx  Social History    Tobacco Use      Smo No results for input(s): ALT, AST, ALB, AMYLASE, LIPASE, LDH in the last 168 hours. Invalid input(s): ALPHOS, TBIL, DBIL, TPROT    No results for input(s): TROP in the last 168 hours.          Radiology:         ASSESSMENT / PLAN:     Ms. Sam Johnson is a [id number] - continue on home pain regimen: fentanyl, prn oxy, cymbalta  - seeing rheum as o/p, poss recent increase in RF other labs ordered and workup planned, no change in trx thus far     R knee pain/instability  -plans for f/u out of [place], referred by Dr. Gabriella Valverde CONSULTATION REPORT    Deborah Ruelas  WRO:[id number]  LOS:0    Date of Admission:  9/13/2018  Date of Consult:  9/14/2018     Reason for Consultation: Leg wound      History of Present Illness:  Morro Fontaine is a a(n) 63year old female wit applied.           History:  Past Medical History:   Diagnosis Date   • Anemia 4/23/2018   • ASTHMA    • BACK PAIN    • CONGESTIVE HEART FAILURE    • COPD (chronic obstructive pulmonary disease) (HCC)    • Fibromyalgia    • Head trauma    • Leukopenia 4/23/ Comment:Ineffective for her staph  Dye [Iodine (Topica*        Comment:Nuclear for heart stress test    Medications:     Current Facility-Administered Medications:   •  Normal Saline Flush 0.9 % injection 3 mL, 3 mL, Intravenous, PRN  •  Enoxaparin Sodiu •  lisinopril (PRINIVIL,ZESTRIL) tab 10 mg, 10 mg, Oral, BID  •  magnesium oxide (MAG-OX) tab 400 mg, 400 mg, Oral, Daily  •  Metoclopramide HCl (REGLAN) tab 10 mg, 10 mg, Oral, TID AC and HS  •  Montelukast Sodium (SINGULAIR) tab 10 mg, 10 mg, Oral, Daily BUN 7 09/14/2018    CREATSERUM 0.78 09/14/2018     09/14/2018    CA 9.2 09/14/2018             Impression and Plan:   Patient Active Problem List:     CHRONIC AIRWAY OBSTRUCTION     ASTHMA     Type 2 diabetes mellitus (HCC)     Cellulitis of leg, r wound clinic as an outpatient.    OK to be discharged from surgical standpoint      Yusra Toledo MD   9/14/2018  1:24 PM                Time spent on counseling/coordination of care:  30 Minutes  Total time spent with patient:  15 Minutes[HS.1]    Elec Vitamin B-12 Up To 1000mcg, IM/SC Inj 08/25/14     Vitamin B-12 Up To 1000mcg, IM/SC Inj 08/18/14     Vitamin B-12 Up To 1000mcg, IM/SC Inj 08/13/14     Zoster Vaccine Live (Zostavax) 09/01/16     Zoster Vaccine Live (Zostavax) 08/02/12

## (undated) NOTE — MR AVS SNAPSHOT
[address]  [facility] [address] [address]  [phone]               Thank you for choosing us for your health care visit with Lotus East MD.  We are glad to serve you and happy to provide you w Generic drug:  Syringe/Needle (Disp)           * BD LUER-RORY SYRINGE 22G X 1-1/2\" 3 ML Misc   Generic drug:  Syringe/Needle (Disp)   USE AS DIRECTED BY PHYSICIAN           BD SWAB SINGLE USE REGULAR Pads   USE TO CLEAN FINGER BEFORE TESTING GLUCOSE What changed:  Another medication with the same name was changed. Make sure you understand how and when to take each.    Commonly known as:  LIDODERM           * lidocaine 5 % Oint   APPLY 2 GRAMS TWICE A DAY EXTERNALLY TO AFFECTED AREAS FOR KNEE AND JOINT TAKE 1 TABLET BY MOUTH ONCE DAILY   Commonly known as:  PROTONIX           Polyethylene Glycol 3350 Powd   Take 17 g by mouth daily as needed.    Commonly known as:  GLYCOLAX           risperiDONE 1 MG Tabs   TAKE ONE TABLET BY MOUTH AT BED TIME   Commonly Visit [facility] online at  [email or web address]
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
## (undated) NOTE — LETTER
[address] [place] [address] Rd, [place], [place]     AUTHORIZATION FOR SURGICAL OPERATION OR PROCEDURE    I hereby authorize Dr. Rolf Beavers MD, my Physician(s) and whomever may be designated as the doctor's Assistant, to perform the f 4. I consent to the photographing of procedure(s) to be performed for the purposes of advancing medicine, science and/or education, provided my identity is not revealed.  If the procedure has been videotaped, the physician/surgeon will obtain the original v (Witness signature)                                                                                                  (Date)                                (Time)  STATEMENT OF PHYSICIAN My signature below affirms that prior to the time of the procedure;  I

## (undated) NOTE — IP AVS SNAPSHOT
[facility]            (For Outpatient Use Only) Initial Admit Date: 8/22/2018   Inpt/Obs Admit Date: Inpt: 8/22/18 / Obs: N/A   Discharge Date:    Jennifer Vargas:  [de-identified]   MRN: [de-identified]   CSN: [id number]        ENCOUNTER  Patient Class August 28, 2018

## (undated) NOTE — ED AVS SNAPSHOT
Farshad Rei   MRN: [id number]    Department:  [facility] Emergency Department   Date of Visit:  9/28/2019           Disclosure     Insurance plans vary and the physician(s) referred by the ER may not be covered by your plan.  Please contact y within the next three months to obtain basic health screening including reassessment of your blood pressure.     IF THERE IS ANY CHANGE OR WORSENING OF YOUR CONDITION, CALL YOUR PRIMARY CARE PHYSICIAN AT ONCE OR RETURN IMMEDIATELY TO THE EMERGENCY DEPARTMEN

## (undated) NOTE — LETTER
[address] [place] [address] Rd, [place], [place]     AUTHORIZATION FOR SURGICAL OPERATION OR PROCEDURE    I hereby authorize Dr. Leonidas Todd MD, my Physician(s) and whomever may be designated as the doctor's Assistant, to perform the f 4. I consent to the photographing of procedure(s) to be performed for the purposes of advancing medicine, science and/or education, provided my identity is not revealed.  If the procedure has been videotaped, the physician/surgeon will obtain the original v (Witness signature)                                                                                                  (Date)                                (Time)  STATEMENT OF PHYSICIAN My signature below affirms that prior to the time of the procedure;  I

## (undated) NOTE — LETTER
[address] [place] [address] Rd, [place], [place]     AUTHORIZATION FOR SURGICAL OPERATION OR PROCEDURE    I hereby authorize Dr. Stacey Eng MD, my Physician(s) and whomever may be designated as the doctor's Assistant, to perform the f 4. I consent to the photographing of procedure(s) to be performed for the purposes of advancing medicine, science and/or education, provided my identity is not revealed.  If the procedure has been videotaped, the physician/surgeon will obtain the original v (Witness signature)                                                                                                  (Date)                                (Time)  STATEMENT OF PHYSICIAN My signature below affirms that prior to the time of the procedure;  I

## (undated) NOTE — ED AVS SNAPSHOT
Claudy Cruz   MRN: [id number]    Department:  [facility] Emergency Department   Date of Visit:  2/27/2020           Disclosure     Insurance plans vary and the physician(s) referred by the ER may not be covered by your plan.  Please contact y within the next three months to obtain basic health screening including reassessment of your blood pressure.     IF THERE IS ANY CHANGE OR WORSENING OF YOUR CONDITION, CALL YOUR PRIMARY CARE PHYSICIAN AT ONCE OR RETURN IMMEDIATELY TO THE EMERGENCY DEPARTMEN

## (undated) NOTE — IP AVS SNAPSHOT
[address]  [address] [facility] [phone]                Discharge Summary   3/24/2017    Davis Casanova           Admission Information        Provider Department    3/24/2017 Tita Frazier DO [facility] 5w TAKE ONE CAPSULE BY MOUTH ONCE DAILY    Ny Patelalta 60 mg at AM                    Ferrous Sulfate 325 (65 Fe) MG Tabs   What changed:  See the new instructions.    Next dose due:  March 27 in AM        TAKE 1 TABLET BY MOUTH ONCE SALVATORE mupirocin 2 % Oint   Last time this was given:  3/25/2017  8:49 PM   Commonly known as:  Sol Irvin   What changed:  See the new instructions.    Next dose due:  March 26 at [address] [facility] What changed:  Another medication with the same name was changed. Make sure you understand how and when to take each.    Next dose due:  March 26 at [address] A DAY TO DRY AREAS ON THE LEG    Irvin May Carisoprodol 350 MG Tabs   Commonly known as:  SOMA        Take 350 mg by mouth every 8 (eight) hours as needed for Muscle spasms.                             cyanocobalamin 1000 MCG/ML Soln   Commonly known as:  VITAMIN B12   Next dose due:  March 26, tak TAKE 1 TABLET BY MOUTH BEFORE MEALS AND AT BEDTIME    Trevon Zelaya                                 Polyethylene Glycol 3350 Powd   Commonly known as:  GLYCOLAX        Take 17 g by mouth daily as needed.                             risperiDONE 1 MG Tab Follow up with Chavez Antony MD. Call in 1 day.     Specialties:  CARDIOLOGY, Cardiac Electrophysiology    Why:  call office on monday to schedule appointment to set up heart monitor as outpatient    Contact information:    [address] Additional Information       We are concerned for your overall well being:    - If you are a smoker or have smoked in the last 12 months, we encourage you to explore options for quitting.     - If you have concerns related to behavioral health issues or th call your provider or healthcare team if you have any questions regarding your medications while at home.          Ant-Infective Medications     Sulfamethoxazole-TMP -160 MG Oral Tab per tablet         Use: Treat infections or suspected infection   Mo Most common side effects: Constipation, drowsiness, dizziness, urinary retention (inability to urinate)   What to report to your healthcare team: Difficulty urinating, dizziness, no bowel movement in 2+ days, unresolved pain           Non-Narcotic Pain Med Most common side effects:  Dizziness, drowsiness, headache, nausea/vomiting, somnolence   What to report to your healthcare team: Dizziness, Somnolence, Weakness, Headache, Nausea/vomiting           Mood and Thought Medications     RISPERIDONE 1 MG Oral Ta

## (undated) NOTE — ED AVS SNAPSHOT
Macy Lynch   MRN: [id number]    Department:  [facility] Emergency Department   Date of Visit:  1/30/2020           Disclosure     Insurance plans vary and the physician(s) referred by the ER may not be covered by your plan.  Please contact y within the next three months to obtain basic health screening including reassessment of your blood pressure.     IF THERE IS ANY CHANGE OR WORSENING OF YOUR CONDITION, CALL YOUR PRIMARY CARE PHYSICIAN AT ONCE OR RETURN IMMEDIATELY TO THE EMERGENCY DEPARTMEN